# Patient Record
Sex: FEMALE | Race: BLACK OR AFRICAN AMERICAN | NOT HISPANIC OR LATINO
[De-identification: names, ages, dates, MRNs, and addresses within clinical notes are randomized per-mention and may not be internally consistent; named-entity substitution may affect disease eponyms.]

---

## 2017-12-21 ENCOUNTER — RESULT REVIEW (OUTPATIENT)
Age: 72
End: 2017-12-21

## 2020-04-17 ENCOUNTER — INPATIENT (INPATIENT)
Facility: HOSPITAL | Age: 75
LOS: 7 days | Discharge: HOME | DRG: 435 | End: 2020-04-25
Attending: INTERNAL MEDICINE | Admitting: INTERNAL MEDICINE
Payer: MEDICARE

## 2020-04-17 VITALS
TEMPERATURE: 98 F | DIASTOLIC BLOOD PRESSURE: 85 MMHG | HEIGHT: 61 IN | SYSTOLIC BLOOD PRESSURE: 130 MMHG | HEART RATE: 98 BPM | OXYGEN SATURATION: 98 % | RESPIRATION RATE: 16 BRPM | WEIGHT: 110.01 LBS

## 2020-04-17 DIAGNOSIS — D64.9 ANEMIA, UNSPECIFIED: ICD-10-CM

## 2020-04-17 DIAGNOSIS — E44.0 MODERATE PROTEIN-CALORIE MALNUTRITION: ICD-10-CM

## 2020-04-17 DIAGNOSIS — N39.0 URINARY TRACT INFECTION, SITE NOT SPECIFIED: ICD-10-CM

## 2020-04-17 DIAGNOSIS — E78.5 HYPERLIPIDEMIA, UNSPECIFIED: ICD-10-CM

## 2020-04-17 DIAGNOSIS — I10 ESSENTIAL (PRIMARY) HYPERTENSION: ICD-10-CM

## 2020-04-17 DIAGNOSIS — C25.9 MALIGNANT NEOPLASM OF PANCREAS, UNSPECIFIED: ICD-10-CM

## 2020-04-17 DIAGNOSIS — I26.99 OTHER PULMONARY EMBOLISM WITHOUT ACUTE COR PULMONALE: ICD-10-CM

## 2020-04-17 DIAGNOSIS — E87.6 HYPOKALEMIA: ICD-10-CM

## 2020-04-17 DIAGNOSIS — Z29.9 ENCOUNTER FOR PROPHYLACTIC MEASURES, UNSPECIFIED: ICD-10-CM

## 2020-04-17 LAB
ALBUMIN SERPL ELPH-MCNC: 3.5 G/DL — SIGNIFICANT CHANGE UP (ref 3.5–5)
ALP SERPL-CCNC: 218 U/L — HIGH (ref 40–120)
ALT FLD-CCNC: 53 U/L DA — SIGNIFICANT CHANGE UP (ref 10–60)
ANION GAP SERPL CALC-SCNC: 6 MMOL/L — SIGNIFICANT CHANGE UP (ref 5–17)
APPEARANCE UR: ABNORMAL
APTT BLD: 24.6 SEC — LOW (ref 27.5–36.3)
APTT BLD: 94.2 SEC — HIGH (ref 27.5–36.3)
AST SERPL-CCNC: 74 U/L — HIGH (ref 10–40)
BACTERIA # UR AUTO: ABNORMAL /HPF
BASOPHILS # BLD AUTO: 0 K/UL — SIGNIFICANT CHANGE UP (ref 0–0.2)
BASOPHILS NFR BLD AUTO: 0 % — SIGNIFICANT CHANGE UP (ref 0–2)
BILIRUB SERPL-MCNC: 0.8 MG/DL — SIGNIFICANT CHANGE UP (ref 0.2–1.2)
BILIRUB UR-MCNC: ABNORMAL
BUN SERPL-MCNC: 9 MG/DL — SIGNIFICANT CHANGE UP (ref 7–18)
CALCIUM SERPL-MCNC: 9.5 MG/DL — SIGNIFICANT CHANGE UP (ref 8.4–10.5)
CHLORIDE SERPL-SCNC: 102 MMOL/L — SIGNIFICANT CHANGE UP (ref 96–108)
CO2 SERPL-SCNC: 31 MMOL/L — SIGNIFICANT CHANGE UP (ref 22–31)
COLOR SPEC: ABNORMAL
CREAT SERPL-MCNC: 0.88 MG/DL — SIGNIFICANT CHANGE UP (ref 0.5–1.3)
D DIMER BLD IA.RAPID-MCNC: HIGH NG/ML DDU
DIFF PNL FLD: ABNORMAL
EOSINOPHIL # BLD AUTO: 0 K/UL — SIGNIFICANT CHANGE UP (ref 0–0.5)
EOSINOPHIL NFR BLD AUTO: 0 % — SIGNIFICANT CHANGE UP (ref 0–6)
EPI CELLS # UR: ABNORMAL /HPF
GLUCOSE SERPL-MCNC: 116 MG/DL — HIGH (ref 70–99)
GLUCOSE UR QL: NEGATIVE — SIGNIFICANT CHANGE UP
HCT VFR BLD CALC: 30.1 % — LOW (ref 34.5–45)
HCT VFR BLD CALC: 34.6 % — SIGNIFICANT CHANGE UP (ref 34.5–45)
HGB BLD-MCNC: 10.7 G/DL — LOW (ref 11.5–15.5)
HGB BLD-MCNC: 9.5 G/DL — LOW (ref 11.5–15.5)
INR BLD: 1.37 RATIO — HIGH (ref 0.88–1.16)
KETONES UR-MCNC: ABNORMAL
LEUKOCYTE ESTERASE UR-ACNC: ABNORMAL
LIDOCAIN IGE QN: 6829 U/L — HIGH (ref 73–393)
LYMPHOCYTES # BLD AUTO: 1.4 K/UL — SIGNIFICANT CHANGE UP (ref 1–3.3)
LYMPHOCYTES # BLD AUTO: 17 % — SIGNIFICANT CHANGE UP (ref 13–44)
MANUAL SMEAR VERIFICATION: SIGNIFICANT CHANGE UP
MCHC RBC-ENTMCNC: 23.2 PG — LOW (ref 27–34)
MCHC RBC-ENTMCNC: 23.3 PG — LOW (ref 27–34)
MCHC RBC-ENTMCNC: 30.9 GM/DL — LOW (ref 32–36)
MCHC RBC-ENTMCNC: 31.6 GM/DL — LOW (ref 32–36)
MCV RBC AUTO: 73.8 FL — LOW (ref 80–100)
MCV RBC AUTO: 74.9 FL — LOW (ref 80–100)
MONOCYTES # BLD AUTO: 0.16 K/UL — SIGNIFICANT CHANGE UP (ref 0–0.9)
MONOCYTES NFR BLD AUTO: 2 % — SIGNIFICANT CHANGE UP (ref 2–14)
NEUTROPHILS # BLD AUTO: 6.66 K/UL — SIGNIFICANT CHANGE UP (ref 1.8–7.4)
NEUTROPHILS NFR BLD AUTO: 81 % — HIGH (ref 43–77)
NITRITE UR-MCNC: POSITIVE
NRBC # BLD: 0 /100 WBCS — SIGNIFICANT CHANGE UP (ref 0–0)
NRBC # BLD: 0 /100 — SIGNIFICANT CHANGE UP (ref 0–0)
PH UR: 5 — SIGNIFICANT CHANGE UP (ref 5–8)
PLAT MORPH BLD: NORMAL — SIGNIFICANT CHANGE UP
PLATELET # BLD AUTO: 256 K/UL — SIGNIFICANT CHANGE UP (ref 150–400)
PLATELET # BLD AUTO: 294 K/UL — SIGNIFICANT CHANGE UP (ref 150–400)
POTASSIUM SERPL-MCNC: 3.2 MMOL/L — LOW (ref 3.5–5.3)
POTASSIUM SERPL-SCNC: 3.2 MMOL/L — LOW (ref 3.5–5.3)
PROCALCITONIN SERPL-MCNC: 0.14 NG/ML — HIGH (ref 0.02–0.1)
PROT SERPL-MCNC: 8.7 G/DL — HIGH (ref 6–8.3)
PROT UR-MCNC: 100
PROTHROM AB SERPL-ACNC: 15.6 SEC — HIGH (ref 10–12.9)
RBC # BLD: 4.08 M/UL — SIGNIFICANT CHANGE UP (ref 3.8–5.2)
RBC # BLD: 4.62 M/UL — SIGNIFICANT CHANGE UP (ref 3.8–5.2)
RBC # FLD: 14.1 % — SIGNIFICANT CHANGE UP (ref 10.3–14.5)
RBC # FLD: 14.4 % — SIGNIFICANT CHANGE UP (ref 10.3–14.5)
RBC BLD AUTO: NORMAL — SIGNIFICANT CHANGE UP
RBC CASTS # UR COMP ASSIST: SIGNIFICANT CHANGE UP /HPF (ref 0–2)
SARS-COV-2 RNA SPEC QL NAA+PROBE: SIGNIFICANT CHANGE UP
SODIUM SERPL-SCNC: 139 MMOL/L — SIGNIFICANT CHANGE UP (ref 135–145)
SP GR SPEC: 1.02 — SIGNIFICANT CHANGE UP (ref 1.01–1.02)
TRIGL SERPL-MCNC: 112 MG/DL — SIGNIFICANT CHANGE UP (ref 10–149)
TROPONIN I SERPL-MCNC: <0.015 NG/ML — SIGNIFICANT CHANGE UP (ref 0–0.04)
UROBILINOGEN FLD QL: 8
WBC # BLD: 8.22 K/UL — SIGNIFICANT CHANGE UP (ref 3.8–10.5)
WBC # BLD: 9.09 K/UL — SIGNIFICANT CHANGE UP (ref 3.8–10.5)
WBC # FLD AUTO: 8.22 K/UL — SIGNIFICANT CHANGE UP (ref 3.8–10.5)
WBC # FLD AUTO: 9.09 K/UL — SIGNIFICANT CHANGE UP (ref 3.8–10.5)
WBC UR QL: ABNORMAL /HPF (ref 0–5)

## 2020-04-17 PROCEDURE — 71046 X-RAY EXAM CHEST 2 VIEWS: CPT | Mod: 26

## 2020-04-17 PROCEDURE — 99285 EMERGENCY DEPT VISIT HI MDM: CPT

## 2020-04-17 PROCEDURE — 93970 EXTREMITY STUDY: CPT | Mod: 26

## 2020-04-17 PROCEDURE — 71275 CT ANGIOGRAPHY CHEST: CPT | Mod: 26

## 2020-04-17 PROCEDURE — 74177 CT ABD & PELVIS W/CONTRAST: CPT | Mod: 26

## 2020-04-17 RX ORDER — POTASSIUM CHLORIDE 20 MEQ
40 PACKET (EA) ORAL ONCE
Refills: 0 | Status: COMPLETED | OUTPATIENT
Start: 2020-04-17 | End: 2020-04-17

## 2020-04-17 RX ORDER — HEPARIN SODIUM 5000 [USP'U]/ML
4000 INJECTION INTRAVENOUS; SUBCUTANEOUS ONCE
Refills: 0 | Status: COMPLETED | OUTPATIENT
Start: 2020-04-17 | End: 2020-04-17

## 2020-04-17 RX ORDER — HEPARIN SODIUM 5000 [USP'U]/ML
INJECTION INTRAVENOUS; SUBCUTANEOUS
Qty: 25000 | Refills: 0 | Status: DISCONTINUED | OUTPATIENT
Start: 2020-04-17 | End: 2020-04-17

## 2020-04-17 RX ORDER — SIMVASTATIN 20 MG/1
10 TABLET, FILM COATED ORAL AT BEDTIME
Refills: 0 | Status: DISCONTINUED | OUTPATIENT
Start: 2020-04-17 | End: 2020-04-25

## 2020-04-17 RX ORDER — ACETAMINOPHEN 500 MG
650 TABLET ORAL EVERY 6 HOURS
Refills: 0 | Status: DISCONTINUED | OUTPATIENT
Start: 2020-04-17 | End: 2020-04-25

## 2020-04-17 RX ORDER — HEPARIN SODIUM 5000 [USP'U]/ML
4000 INJECTION INTRAVENOUS; SUBCUTANEOUS EVERY 6 HOURS
Refills: 0 | Status: DISCONTINUED | OUTPATIENT
Start: 2020-04-17 | End: 2020-04-17

## 2020-04-17 RX ORDER — IOHEXOL 300 MG/ML
30 INJECTION, SOLUTION INTRAVENOUS ONCE
Refills: 0 | Status: COMPLETED | OUTPATIENT
Start: 2020-04-17 | End: 2020-04-17

## 2020-04-17 RX ORDER — CEFTRIAXONE 500 MG/1
1000 INJECTION, POWDER, FOR SOLUTION INTRAMUSCULAR; INTRAVENOUS ONCE
Refills: 0 | Status: COMPLETED | OUTPATIENT
Start: 2020-04-17 | End: 2020-04-17

## 2020-04-17 RX ORDER — ENOXAPARIN SODIUM 100 MG/ML
60 INJECTION SUBCUTANEOUS
Refills: 0 | Status: DISCONTINUED | OUTPATIENT
Start: 2020-04-17 | End: 2020-04-19

## 2020-04-17 RX ORDER — AMLODIPINE BESYLATE 2.5 MG/1
5 TABLET ORAL DAILY
Refills: 0 | Status: DISCONTINUED | OUTPATIENT
Start: 2020-04-17 | End: 2020-04-17

## 2020-04-17 RX ORDER — CEFTRIAXONE 500 MG/1
1000 INJECTION, POWDER, FOR SOLUTION INTRAMUSCULAR; INTRAVENOUS EVERY 24 HOURS
Refills: 0 | Status: DISCONTINUED | OUTPATIENT
Start: 2020-04-17 | End: 2020-04-19

## 2020-04-17 RX ORDER — SODIUM CHLORIDE 9 MG/ML
1000 INJECTION, SOLUTION INTRAVENOUS
Refills: 0 | Status: DISCONTINUED | OUTPATIENT
Start: 2020-04-17 | End: 2020-04-22

## 2020-04-17 RX ORDER — HEPARIN SODIUM 5000 [USP'U]/ML
2000 INJECTION INTRAVENOUS; SUBCUTANEOUS EVERY 6 HOURS
Refills: 0 | Status: DISCONTINUED | OUTPATIENT
Start: 2020-04-17 | End: 2020-04-17

## 2020-04-17 RX ORDER — ASPIRIN/CALCIUM CARB/MAGNESIUM 324 MG
81 TABLET ORAL DAILY
Refills: 0 | Status: DISCONTINUED | OUTPATIENT
Start: 2020-04-17 | End: 2020-04-20

## 2020-04-17 RX ORDER — MORPHINE SULFATE 50 MG/1
4 CAPSULE, EXTENDED RELEASE ORAL ONCE
Refills: 0 | Status: DISCONTINUED | OUTPATIENT
Start: 2020-04-17 | End: 2020-04-17

## 2020-04-17 RX ADMIN — HEPARIN SODIUM 4000 UNIT(S): 5000 INJECTION INTRAVENOUS; SUBCUTANEOUS at 13:49

## 2020-04-17 RX ADMIN — Medication 40 MILLIEQUIVALENT(S): at 12:34

## 2020-04-17 RX ADMIN — MORPHINE SULFATE 4 MILLIGRAM(S): 50 CAPSULE, EXTENDED RELEASE ORAL at 12:33

## 2020-04-17 RX ADMIN — Medication 40 MILLIEQUIVALENT(S): at 16:10

## 2020-04-17 RX ADMIN — HEPARIN SODIUM 900 UNIT(S)/HR: 5000 INJECTION INTRAVENOUS; SUBCUTANEOUS at 13:50

## 2020-04-17 RX ADMIN — CEFTRIAXONE 100 MILLIGRAM(S): 500 INJECTION, POWDER, FOR SOLUTION INTRAMUSCULAR; INTRAVENOUS at 13:45

## 2020-04-17 RX ADMIN — SIMVASTATIN 10 MILLIGRAM(S): 20 TABLET, FILM COATED ORAL at 23:00

## 2020-04-17 RX ADMIN — IOHEXOL 30 MILLILITER(S): 300 INJECTION, SOLUTION INTRAVENOUS at 10:33

## 2020-04-17 NOTE — ED PROVIDER NOTE - CARE PLAN
Principal Discharge DX:	Malignant neoplasm of pancreas, unspecified location of malignancy  Secondary Diagnosis:	Multiple subsegmental pulmonary emboli without acute cor pulmonale

## 2020-04-17 NOTE — H&P ADULT - PROBLEM SELECTOR PLAN 9
IMPROVE VTE Individual Risk Assessment    RISK                                                                Points  [  ] Previous VTE                                                  3  [  ] Thrombophilia                                               2  [  ] Lower limb paralysis                                      2        (unable to hold up >15 seconds)    [  ] Current Cancer                                              2         (within 6 months)  [x ] Immobilization > 24 hrs                                1  [  ] ICU/CCU stay > 24 hours                              1  [x  ] Age > 60                                                      1  IMPROVE VTE Score __2_DVT ppx Heparin drip_  )

## 2020-04-17 NOTE — H&P ADULT - PROBLEM SELECTOR PLAN 3
Pt is asymptomatic, no dysuria, no increased frequency of micturition, no hematuria.  AFebrile,  NO Leucocytosis  Positive UA   - Will start with Rocephin   - Will  c/w maintenance ivf  f/u Urine cultures Pt is asymptomatic, no dysuria, no increased frequency of micturition, no hematuria.  AFebrile,  NO Leucocytosis  Positive UA, check Ucx  - Will start with Rocephin   - Will  c/w maintenance ivf  f/u Urine cultures

## 2020-04-17 NOTE — H&P ADULT - NSHPPHYSICALEXAM_GEN_ALL_CORE
Vital Signs Last 24 Hrs  T(C): 36.8 (17 Apr 2020 14:00), Max: 36.8 (17 Apr 2020 14:00)  T(F): 98.3 (17 Apr 2020 14:00), Max: 98.3 (17 Apr 2020 14:00)  HR: 93 (17 Apr 2020 14:00) (87 - 98)  BP: 113/81 (17 Apr 2020 14:00) (113/81 - 151/83)  BP(mean): --  RR: 17 (17 Apr 2020 14:00) (16 - 18)  SpO2: 100% (17 Apr 2020 14:00) (98% - 100%)  PHYSICAL EXAM:  GENERAL: NAD  HEAD:  Atraumatic, Normocephalic  EYES:  conjunctiva and sclera clear  NECK: Supple, No JVD, Normal thyroid  CHEST/LUNG: Clear to auscultation. Clear to percussion bilaterally; No rales, rhonchi, wheezing, or rubs  HEART: Regular rate and rhythm; No murmurs, rubs, or gallops  ABDOMEN: Soft, Nontender, Nondistended; Bowel sounds present, TENDERNESS IN Left upper quadrant.  NERVOUS SYSTEM:  Alert & Oriented X3,    EXTREMITIES:  2+ Peripheral Pulses, No clubbing, cyanosis, or edema  SKIN: warm dry Vital Signs Last 24 Hrs  T(C): 36.8 (17 Apr 2020 14:00), Max: 36.8 (17 Apr 2020 14:00)  T(F): 98.3 (17 Apr 2020 14:00), Max: 98.3 (17 Apr 2020 14:00)  HR: 93 (17 Apr 2020 14:00) (87 - 98)  BP: 113/81 (17 Apr 2020 14:00) (113/81 - 151/83)  RR: 17 (17 Apr 2020 14:00) (16 - 18)  SpO2: 100% (17 Apr 2020 14:00) (98% - 100%)  PHYSICAL EXAM:  GENERAL: NAD  HEAD:  Atraumatic, Normocephalic  EYES:  conjunctiva and sclera clear  NECK: Supple, No JVD, Normal thyroid  CHEST/LUNG: Clear to auscultation. Clear to percussion bilaterally; No rales, rhonchi, wheezing, or rubs  HEART: Regular rate and rhythm; No murmurs, rubs, or gallops  ABDOMEN: Soft, Nontender, Nondistended; Bowel sounds present, TENDERNESS IN Left upper quadrant.  NERVOUS SYSTEM:  Alert & Oriented X3,    EXTREMITIES:  2+ Peripheral Pulses, No clubbing, cyanosis, or edema  SKIN: warm dry

## 2020-04-17 NOTE — H&P ADULT - ATTENDING COMMENTS
Patient seen and examined. Patient's history, vitals, labs, imaging studies reviewed. Discussed with above resident, agree with note with edits and educated on the diagnosis and management of above medical conditions. Plan of care discussed with patient, and agrees, all questions answered.   Jessy Parkinson MD  4/17/2020

## 2020-04-17 NOTE — ED PROVIDER NOTE - OBJECTIVE STATEMENT
75 y/o F patient with a significant PMHx of HTN presents to the ED for chest pain, and abdominal pain x10 days. Patient reports substernal chest pain radiating to epigastric pain and entire abdomen and back x10 days. Patient state pain is not relieved by Prilosec which was prescribed by urgent care. Patient relates SOB associated with speaking and body aches. Patient denies any family history of DVT or PE. Patient denies any fever, chills, vomiting, diarrhea, calf pain, tenderness recent travel or sick contacts, or any other complaints.

## 2020-04-17 NOTE — H&P ADULT - PROBLEM SELECTOR PLAN 7
- K is 3.2on admission,   - likely secondary to poor PO intake  - replacing KCl 40 mg x 2  - Continue to monitor electrolytes.  - f/u BMP - K is 3.2 on admission,   - likely secondary to poor PO intake  - replacing KCl 40 mg x 2  - Continue to monitor electrolytes.  - f/u BMP

## 2020-04-17 NOTE — ED PROVIDER NOTE - CLINICAL SUMMARY MEDICAL DECISION MAKING FREE TEXT BOX
Pt w/ aforementioned presentation concerning for but not limited to atypical presentation of ACS, gastritis, hepatobiliary disease, UTI, appendicitis, diverticulitis, obstruction, atypical presentation of COVID19, PE. Will get labs, imaging, give meds, monitor and reassess.

## 2020-04-17 NOTE — H&P ADULT - PROBLEM SELECTOR PLAN 2
Lipase of 6829k  CT scan showed Pulmonary embolism, Pancreatic cancer with liver metastasis.  f/u Heme oncologist   f/u FOBT  Pt doesn't have PCP and wants to have a PCP appointment on discharge.

## 2020-04-17 NOTE — H&P ADULT - NSHPSOCIALHISTORY_GEN_ALL_CORE
Pt smoked for 20 yrs and Quit 4 yrs back. She drinks Alcohol occasionally. Pt smoked for 20 yrs and Quit 4 yrs back. She drinks Alcohol occasionally, denies illicit drug use.

## 2020-04-17 NOTE — H&P ADULT - NSHPREVIEWOFSYSTEMS_GEN_ALL_CORE
REVIEW OF SYSTEMS:    CONSTITUTIONAL: No weakness, fevers or chills  EYES/ENT: No visual changes;  No vertigo or throat pain   NECK: No pain or stiffness  RESPIRATORY:  shortness of breath is present   CARDIOVASCULAR: No chest pain or palpitations  GASTROINTESTINAL:  abdominal or epigastric pain.   GENITOURINARY: No dysuria, frequency or hematuria  NEUROLOGICAL: No numbness or weakness  SKIN: No itching, burning, rashes, or lesions   All other review of systems is negative unless indicated above.

## 2020-04-17 NOTE — H&P ADULT - PROBLEM SELECTOR PLAN 1
Problem: Pulmonary embolism.   D dimer of 14k  Most likely due to Pancreatic cancer   CT scan showed Pulmonary embolism  -She was started on Heparin drip  -Continue heparin drip for now with aPTT monitoring.  -PESI score 102 based on initial VS, Class 3, Intermediate Risk: 3.2-7% 30-day mortality.  check TTE  Doppler venous u/s  currently saturating on RA Problem: Pulmonary embolism.   D dimer of 14k  Most likely due to Pancreatic cancer   CT scan showed Pulmonary embolism  -She was started on Heparin drip  -transition to Lovenox  -PESI score 102 based on initial VS, Class 3, Intermediate Risk: 3.2-7% 30-day mortality.  check TTE  Doppler venous u/s  currently saturating on RA

## 2020-04-17 NOTE — H&P ADULT - NSHPLABSRESULTS_GEN_ALL_CORE
LABS:                        10.7   8.22  )-----------( 294      ( 2020 10:41 )             34.6         139  |  102  |  9   ----------------------------<  116<H>  3.2<L>   |  31  |  0.88    Ca    9.5      2020 10:41    TPro  8.7<H>  /  Alb  3.5  /  TBili  0.8  /  DBili  x   /  AST  74<H>  /  ALT  53  /  AlkPhos  218<H>      PT/INR - ( 2020 10:41 )   PT: 15.6 sec;   INR: 1.37 ratio         PTT - ( 2020 10:41 )  PTT:24.6 sec  Urinalysis Basic - ( 2020 10:00 )    Color: Marcia / Appearance: Slightly Turbid / S.025 / pH: x  Gluc: x / Ketone: Small  / Bili: Moderate / Urobili: 8   Blood: x / Protein: 100 / Nitrite: Positive   Leuk Esterase: Moderate / RBC: 0-2 /HPF / WBC 6-10 /HPF   Sq Epi: x / Non Sq Epi: Occasional /HPF / Bacteria: Few /HPF      LIVER FUNCTIONS - ( 2020 10:41 )  Alb: 3.5 g/dL / Pro: 8.7 g/dL / ALK PHOS: 218 U/L / ALT: 53 U/L DA / AST: 74 U/L / GGT: x           Lipase, Serum: 6829 U/L (20 @ 10:41)

## 2020-04-17 NOTE — H&P ADULT - ASSESSMENT
73 y/o F from home, ambulates independently, Lives with friend with a significant PMHx of HTN, HLD, Nasal Polyps, Former smoker with 20 pack history and PSH of Tubal ligation presents to the ED for chest pain, and abdominal pain. Pt is admitted to medicine for Pulmonary Embolism. 75 y/o F from home, ambulates independently, Lives with friend with a significant PMHx of HTN, HLD, Nasal Polyps, Former smoker with 20 pack history and PSH of Tubal ligation presents to the ED for chest pain, and abdominal pain. Pt is admitted to medicine for Pulmonary Embolism.

## 2020-04-17 NOTE — H&P ADULT - HISTORY OF PRESENT ILLNESS
73 y/o F from home, ambulates independently, Lives with friend with a significant PMHx of HTN, HLD, Nasal Polyps, Former smoker with 20 pack history and PSH of Tubal ligation presents to the ED for chest pain, and abdominal pain since 12 days. Abdominal pain was radiating to the chest and back. Pt states she had Right arm pain and she was taking Aspirin every 4 hrs. But it was not relieved and she called [phone]. They talked to her over phone and asked her to take Prilosec. But her symptoms got worse and she went to Urgent care today. She was also bleeding from the nose lsat week but that was due to polyps. Patient denies any family history of DVT or PE. Patient denies any fever, chills, vomiting, diarrhea, calf pain, Dizziness, Dysuria,  recent travel or sick contacts, or any other complaints.  GOC Full code 73 y/o F from home, ambulates independently, Lives with friend with a significant PMHx of HTN, HLD, Nasal Polyps, Former smoker with 20 pack history and PSH of Tubal ligation presents to the ED for chest pain, and abdominal pain since 12 days. Abdominal pain was radiating to the chest and back. Pt states she had Right arm pain and she was taking Aspirin every 4 hrs. But it was not relieved and she called 311. They talked to her over phone and asked her to take Prilosec. But her symptoms got worse and she went to Urgent care today. She was also bleeding from the nose lsat week but that was due to polyps. She has weight loss, good appetite. Patient denies any family history of DVT or PE. Patient denies any fever, chills, vomiting, diarrhea, calf pain, Dizziness, Dysuria,  recent travel or sick contacts, or any other complaints.  GOC Full code

## 2020-04-17 NOTE — ED PROVIDER NOTE - PHYSICAL EXAMINATION
General: pt lying in stretcher, appears stated age and is not in distress  HEENT: AT/NC, pink conjunctiva, anicteric sclerae, EOMI, PERRLA, TMs smooth, grey, intact, with normal landmarks, nasal mucosa pink, no discharge, turbinates not enlarged; moist mucus membranes, tongue well-papillated, good dentition; posterior pharynx shows no erythema or exudates;   Neck: supple, full ROM, trachea midline, no JVD, no cervical LAD, no midline ttp or stepoffs  Lungs: symmetric excursion, b/l clear vesicular breath sounds with no wheezes, crackles, or rhonchi  Heart: rrr, S1, S2 normal; no S3 or S4; no murmurs or rubs  Abd: normal bowel sounds; soft, nontender; negative McBurney's point tenderness, negative Mcdaniels's sign, no rebound, no guarding, spleen non-palpable; no hepatomegaly, no masses  Back: no midline spinal tenderness or stepoffs, no costovertebral angle tenderness  Extremities: no clubbing, cyanosis, or edema; no palpable deformities or fractures  Skin: good turgor; no rashes, petechiae, ecchymoses, or jaundice  Pulses: radial, posterior tibialis (PT), dorsalis pedis (DP) all 2+ & symmetric  Neuro: awake, alert, responsive; oriented to person, place and time; cranial nerves intact, EOMI, intact jaw movement, intact facial sensation, no facial asymmetry, hearing intact; no nystagmus, tongue midline; Motor: Normal tone in upper and lower extremities bilaterally strength 5/5; Sensory: intact to pinprick and light touch; Cerebellar: finger-to-nose intact; normal steady gait; negative Romberg’s sign; DTR: biceps, triceps, patellar, 2+, no pronator drift General: pt lying in stretcher, appears stated age and is not in distress, has mild tachypnea but speaking in full clear sentences  HEENT: AT/NC, pink conjunctiva, anicteric sclerae, EOMI, PERRLA, moist mucus membranes  Neck: supple, full ROM, trachea midline, no JVD, no cervical LAD, no midline ttp or stepoffs  Lungs: symmetric excursion, b/l clear vesicular breath sounds with no wheezes, crackles, or rhonchi  Heart: rrr, S1, S2 normal; no S3 or S4; no murmurs or rubs  Abd: normal bowel sounds; soft, generalized tenderness to palpation; no rebound, no guarding, spleen non-palpable; no hepatomegaly, no masses  Back: no midline spinal tenderness or stepoffs, no costovertebral angle tenderness  Extremities: no clubbing, cyanosis, or edema; no palpable deformities or fractures  Skin: good turgor; no rashes, petechiae, ecchymoses, or jaundice  Pulses: radial, posterior tibialis (PT), dorsalis pedis (DP) all 2+ & symmetric  Neuro: awake, alert, responsive; oriented to person, place and time; cranial nerves intact, EOMI, intact jaw movement, intact facial sensation, no facial asymmetry, hearing intact; no nystagmus, tongue midline; Motor: Normal tone in upper and lower extremities bilaterally strength 5/5; Sensory: intact; normal steady gait

## 2020-04-18 LAB
24R-OH-CALCIDIOL SERPL-MCNC: 72.7 NG/ML — SIGNIFICANT CHANGE UP (ref 30–80)
A1C WITH ESTIMATED AVERAGE GLUCOSE RESULT: 6.2 % — HIGH (ref 4–5.6)
ALBUMIN SERPL ELPH-MCNC: 3.6 G/DL — SIGNIFICANT CHANGE UP (ref 3.5–5)
ALP SERPL-CCNC: 245 U/L — HIGH (ref 40–120)
ALT FLD-CCNC: 54 U/L DA — SIGNIFICANT CHANGE UP (ref 10–60)
ANION GAP SERPL CALC-SCNC: 8 MMOL/L — SIGNIFICANT CHANGE UP (ref 5–17)
APTT BLD: 26 SEC — LOW (ref 27.5–36.3)
AST SERPL-CCNC: 73 U/L — HIGH (ref 10–40)
BASOPHILS # BLD AUTO: 0.09 K/UL — SIGNIFICANT CHANGE UP (ref 0–0.2)
BASOPHILS NFR BLD AUTO: 0.9 % — SIGNIFICANT CHANGE UP (ref 0–2)
BILIRUB SERPL-MCNC: 0.7 MG/DL — SIGNIFICANT CHANGE UP (ref 0.2–1.2)
BUN SERPL-MCNC: 9 MG/DL — SIGNIFICANT CHANGE UP (ref 7–18)
CALCIUM SERPL-MCNC: 10.1 MG/DL — SIGNIFICANT CHANGE UP (ref 8.4–10.5)
CHLORIDE SERPL-SCNC: 102 MMOL/L — SIGNIFICANT CHANGE UP (ref 96–108)
CHOLEST SERPL-MCNC: 194 MG/DL — SIGNIFICANT CHANGE UP (ref 10–199)
CK SERPL-CCNC: 74 U/L — SIGNIFICANT CHANGE UP (ref 21–215)
CO2 SERPL-SCNC: 28 MMOL/L — SIGNIFICANT CHANGE UP (ref 22–31)
CREAT SERPL-MCNC: 0.8 MG/DL — SIGNIFICANT CHANGE UP (ref 0.5–1.3)
CRP SERPL-MCNC: 6.28 MG/DL — HIGH (ref 0–0.4)
D DIMER BLD IA.RAPID-MCNC: HIGH NG/ML DDU
EOSINOPHIL # BLD AUTO: 0.32 K/UL — SIGNIFICANT CHANGE UP (ref 0–0.5)
EOSINOPHIL NFR BLD AUTO: 3.2 % — SIGNIFICANT CHANGE UP (ref 0–6)
ERYTHROCYTE [SEDIMENTATION RATE] IN BLOOD: 34 MM/HR — HIGH (ref 0–20)
ESTIMATED AVERAGE GLUCOSE: 131 MG/DL — HIGH (ref 68–114)
FERRITIN SERPL-MCNC: 2208 NG/ML — HIGH (ref 15–150)
FOLATE SERPL-MCNC: 14 NG/ML — SIGNIFICANT CHANGE UP
GLUCOSE SERPL-MCNC: 121 MG/DL — HIGH (ref 70–99)
HCT VFR BLD CALC: 36.6 % — SIGNIFICANT CHANGE UP (ref 34.5–45)
HCV AB S/CO SERPL IA: 0.14 S/CO — SIGNIFICANT CHANGE UP (ref 0–0.99)
HCV AB SERPL-IMP: SIGNIFICANT CHANGE UP
HDLC SERPL-MCNC: 32 MG/DL — LOW
HGB BLD-MCNC: 11.4 G/DL — LOW (ref 11.5–15.5)
IMM GRANULOCYTES NFR BLD AUTO: 0.4 % — SIGNIFICANT CHANGE UP (ref 0–1.5)
INR BLD: 1.19 RATIO — HIGH (ref 0.88–1.16)
IRON SATN MFR SERPL: 22 % — SIGNIFICANT CHANGE UP (ref 15–50)
IRON SATN MFR SERPL: 69 UG/DL — SIGNIFICANT CHANGE UP (ref 40–150)
LDH SERPL L TO P-CCNC: 429 U/L — HIGH (ref 120–225)
LIPID PNL WITH DIRECT LDL SERPL: 137 MG/DL — SIGNIFICANT CHANGE UP
LYMPHOCYTES # BLD AUTO: 1.37 K/UL — SIGNIFICANT CHANGE UP (ref 1–3.3)
LYMPHOCYTES # BLD AUTO: 13.8 % — SIGNIFICANT CHANGE UP (ref 13–44)
MAGNESIUM SERPL-MCNC: 2.5 MG/DL — SIGNIFICANT CHANGE UP (ref 1.6–2.6)
MCHC RBC-ENTMCNC: 23.4 PG — LOW (ref 27–34)
MCHC RBC-ENTMCNC: 31.1 GM/DL — LOW (ref 32–36)
MCV RBC AUTO: 75 FL — LOW (ref 80–100)
MONOCYTES # BLD AUTO: 0.59 K/UL — SIGNIFICANT CHANGE UP (ref 0–0.9)
MONOCYTES NFR BLD AUTO: 6 % — SIGNIFICANT CHANGE UP (ref 2–14)
NEUTROPHILS # BLD AUTO: 7.49 K/UL — HIGH (ref 1.8–7.4)
NEUTROPHILS NFR BLD AUTO: 75.7 % — SIGNIFICANT CHANGE UP (ref 43–77)
NRBC # BLD: 0 /100 WBCS — SIGNIFICANT CHANGE UP (ref 0–0)
PHOSPHATE SERPL-MCNC: 3.9 MG/DL — SIGNIFICANT CHANGE UP (ref 2.5–4.5)
PLATELET # BLD AUTO: 325 K/UL — SIGNIFICANT CHANGE UP (ref 150–400)
POTASSIUM SERPL-MCNC: 4.1 MMOL/L — SIGNIFICANT CHANGE UP (ref 3.5–5.3)
POTASSIUM SERPL-SCNC: 4.1 MMOL/L — SIGNIFICANT CHANGE UP (ref 3.5–5.3)
PROCALCITONIN SERPL-MCNC: 0.15 NG/ML — HIGH (ref 0.02–0.1)
PROT SERPL-MCNC: 9.3 G/DL — HIGH (ref 6–8.3)
PROTHROM AB SERPL-ACNC: 13.5 SEC — HIGH (ref 10–12.9)
RBC # BLD: 4.88 M/UL — SIGNIFICANT CHANGE UP (ref 3.8–5.2)
RBC # FLD: 14.4 % — SIGNIFICANT CHANGE UP (ref 10.3–14.5)
SODIUM SERPL-SCNC: 138 MMOL/L — SIGNIFICANT CHANGE UP (ref 135–145)
TIBC SERPL-MCNC: 311 UG/DL — SIGNIFICANT CHANGE UP (ref 250–450)
TOTAL CHOLESTEROL/HDL RATIO MEASUREMENT: 6.1 RATIO — SIGNIFICANT CHANGE UP (ref 3.3–7.1)
TRANSFERRIN SERPL-MCNC: 249 MG/DL — SIGNIFICANT CHANGE UP (ref 200–360)
TRIGL SERPL-MCNC: 123 MG/DL — SIGNIFICANT CHANGE UP (ref 10–149)
TROPONIN I SERPL-MCNC: <0.015 NG/ML — SIGNIFICANT CHANGE UP (ref 0–0.04)
TSH SERPL-MCNC: 1.04 UU/ML — SIGNIFICANT CHANGE UP (ref 0.34–4.82)
UIBC SERPL-MCNC: 242 UG/DL — SIGNIFICANT CHANGE UP (ref 110–370)
VIT B12 SERPL-MCNC: 1281 PG/ML — HIGH (ref 232–1245)
WBC # BLD: 9.9 K/UL — SIGNIFICANT CHANGE UP (ref 3.8–10.5)
WBC # FLD AUTO: 9.9 K/UL — SIGNIFICANT CHANGE UP (ref 3.8–10.5)

## 2020-04-18 PROCEDURE — 93971 EXTREMITY STUDY: CPT | Mod: 26,RT

## 2020-04-18 RX ORDER — ONDANSETRON 8 MG/1
4 TABLET, FILM COATED ORAL EVERY 8 HOURS
Refills: 0 | Status: DISCONTINUED | OUTPATIENT
Start: 2020-04-18 | End: 2020-04-25

## 2020-04-18 RX ORDER — OXYCODONE AND ACETAMINOPHEN 5; 325 MG/1; MG/1
1 TABLET ORAL EVERY 6 HOURS
Refills: 0 | Status: DISCONTINUED | OUTPATIENT
Start: 2020-04-18 | End: 2020-04-25

## 2020-04-18 RX ADMIN — OXYCODONE AND ACETAMINOPHEN 1 TABLET(S): 5; 325 TABLET ORAL at 18:48

## 2020-04-18 RX ADMIN — CEFTRIAXONE 100 MILLIGRAM(S): 500 INJECTION, POWDER, FOR SOLUTION INTRAMUSCULAR; INTRAVENOUS at 11:22

## 2020-04-18 RX ADMIN — ENOXAPARIN SODIUM 60 MILLIGRAM(S): 100 INJECTION SUBCUTANEOUS at 06:47

## 2020-04-18 RX ADMIN — ENOXAPARIN SODIUM 60 MILLIGRAM(S): 100 INJECTION SUBCUTANEOUS at 17:04

## 2020-04-18 RX ADMIN — SIMVASTATIN 10 MILLIGRAM(S): 20 TABLET, FILM COATED ORAL at 21:24

## 2020-04-18 RX ADMIN — Medication 81 MILLIGRAM(S): at 11:22

## 2020-04-18 NOTE — CHART NOTE - NSCHARTNOTEFT_GEN_A_CORE
Pt with newly diagnosed PE and pancreatic adenoCA with liver mets, unknown previously.   Has had ~1mo h/o R arm pain with swelling, abdominal pain/back discomfort, has been treated for heart burn. No prior family h/o of malignancy except for a niece with breast CA. Weight loss over several months, not dramatic per pt. No change in appetite.   Pt HDS, saturating well on RA. Dr. Haynes consulted for hem/onc, RUE duplex ordered to eval DVT. D/w Dr. Parkinson.

## 2020-04-18 NOTE — PROGRESS NOTE ADULT - ASSESSMENT
75 y/o F from home, ambulates independently, lives with friend with a significant PMHx of HTN, HLD, Nasal Polyps, Former smoker with 20 pack history and PSH of Tubal ligation presents to the ED for chest pain, and abdominal pain. Pt is admitted to medicine for Pulmonary Embolism.        > Pulmonary embolism.    D dimer elevation of 14k  Most likely due to Pancreatic cancer   CT scan showed Pulmonary embolism  c/w Lovenox bid  f/u TTE  Doppler venous u/s - negative  currently saturating well on RA.    > Pancreatic carcinoma metastatic to liver.    initial Lipase of 6829  CT scan showed Pulmonary embolism, Pancreatic cancer with liver metastasis.  f/u Heme oncologist - Dr. Haynes  f/u FOBT  Pt doesn't have PCP and wants to have a PCP appointment on discharge.       > UTI (urinary tract infection).  Plan: Pt is asymptomatic, no dysuria, no increased frequency of micturition, no hematuria.  AFebrile,  NO Leucocytosis  Positive UA, pending Ucx  is on Rocephin     > HTN (hypertension).    Holding anti hypertensives Amlodipine, Enalapril for now due to Borderline low BP.       > Hyperlipidemia.  Plan: continue with statin  LDL- 137      > Anemia. Plan: Pt with Hb  10.7, most likely due to Malignancy   FOBT  f/u iron studies, Vit B12 and Folate.    > Hypokalemia.  - K 3.2 on admission,   likely secondary to poor PO intake  replaced, and corrected  continue to monitor electrolytes.  f/u BMP.    > Moderate protein-calorie malnutrition.   likely due to Malignancy   f/u nutritionist.        Patient seen and examined. Patient's history, vitals, labs, imaging studies reviewed. Discussed with PA and educated on the diagnosis and management of above medical conditions. Plan of care discussed with patient, and agrees, all questions answered.   Jessy Parkinson MD

## 2020-04-18 NOTE — PROGRESS NOTE ADULT - SUBJECTIVE AND OBJECTIVE BOX
Patient is a 74 year old  Female who presents with a chief complaint of Abdominal pain (2020 17:28)        MEDICATIONS  (STANDING):  aspirin  chewable 81 milliGRAM(s) Oral daily  cefTRIAXone   IVPB 1000 milliGRAM(s) IV Intermittent every 24 hours  enoxaparin Injectable 60 milliGRAM(s) SubCutaneous two times a day  lactated ringers. 1000 milliLiter(s) (75 mL/Hr) IV Continuous <Continuous>  simvastatin 10 milliGRAM(s) Oral at bedtime    MEDICATIONS  (PRN):  acetaminophen   Tablet .. 650 milliGRAM(s) Oral every 6 hours PRN Temp greater or equal to 38C (100.4F), Mild Pain (1 - 3)      REVIEW OF SYSTEMS:  CONSTITUTIONAL: No fever, weight loss, or fatigue  EYES: No eye pain, visual disturbances, or discharge  ENMT:  No difficulty hearing, tinnitus, vertigo; No sinus or throat pain  NECK: No pain or stiffness  RESPIRATORY: No cough, wheezing, chills or hemoptysis; No shortness of breath  CARDIOVASCULAR: No chest pain, palpitations, dizziness, or leg swelling  GASTROINTESTINAL: No abdominal or epigastric pain. No nausea, vomiting, or hematemesis; No diarrhea or constipation. No melena or hematochezia.  GENITOURINARY: No dysuria, frequency, hematuria, or incontinence  NEUROLOGICAL: No headaches, memory loss, loss of strength, numbness, or tremors  SKIN: No itching, burning, rashes, or lesions   LYMPH NODES: No enlarged glands  ENDOCRINE: No heat or cold intolerance; No hair loss  MUSCULOSKELETAL: No joint pain or swelling; No muscle, back, or extremity pain  PSYCHIATRIC: No depression, anxiety, mood swings, or difficulty sleeping  HEME/LYMPH: No easy bruising, or bleeding gums  ALLERY AND IMMUNOLOGIC: No hives or eczema    PHYSICAL EXAM:    T(C): 37 (20 @ 11:05), Max: 37.3 (20 @ 07:41)  HR: 96 (20 @ 11:05) (77 - 96)  BP: 124/78 (20 @ 11:05) (108/64 - 124/78)  RR: 18 (20 @ 11:05) (16 - 19)  SpO2: 100% (20 @ 11:05) (97% - 100%)        GENERAL: NAD, well-groomed, well-developed  HEAD:  Atraumatic, Normocephalic  EYES: EOMI, PERRL, conjunctiva and sclera clear  ENMT: No tonsillar erythema, exudates, or enlargement; Moist mucous membranes, Good dentition, No lesions  NECK: Supple, No JVD, Normal thyroid  NERVOUS SYSTEM:  Alert & Oriented X3, Good concentration; Motor Strength 5/5 B/L upper and lower extremities; DTRs 2+ intact and symmetric  CHEST/LUNG: Clear to ascultation bilaterally; No rales, rhonchi, wheezing, or rubs  HEART: Regular rate and rhythm; No murmurs, rubs, or gallops  ABDOMEN: Soft, Nontender, Nondistended; Bowel sounds present  EXTREMITIES:  2+ Peripheral Pulses, No clubbing, cyanosis, or edema  LYMPH: No lymphadenopathy noted  SKIN: No rashes or lesions    LABS:                        11.4   9.90  )-----------( 325      ( 2020 06:38 )             36.6     04-18    138  |  102  |  9   ----------------------------<  121<H>  4.1   |  28  |  0.80    Ca    10.1      2020 06:38  Phos  3.9     -18  Mg     2.5     -18    TPro  9.3<H>  /  Alb  3.6  /  TBili  0.7  /  DBili  x   /  AST  73<H>  /  ALT  54  /  AlkPhos  245<H>  04-18    PT/INR - ( 2020 06:38 )   PT: 13.5 sec;   INR: 1.19 ratio         PTT - ( 2020 06:38 )  PTT:26.0 sec  Urinalysis Basic - ( 2020 10:00 )    Color: Marcia / Appearance: Slightly Turbid / S.025 / pH: x  Gluc: x / Ketone: Small  / Bili: Moderate / Urobili: 8   Blood: x / Protein: 100 / Nitrite: Positive   Leuk Esterase: Moderate / RBC: 0-2 /HPF / WBC 6-10 /HPF   Sq Epi: x / Non Sq Epi: Occasional /HPF / Bacteria: Few /HPF      RADIOLOGY & ADDITIONAL TESTS:    Imaging Personally Reviewed:  [x] YES  [ ] NO    Consultant(s) Notes Reviewed:  [x] YES  [ ] NO    Care Discussed with Consultants/Other Providers [x] YES  [ ] NO Patient is a 74 year old  Female who presents with a chief complaint of Abdominal pain (2020 17:28)    Patient seen and examined, reports she feels better today    MEDICATIONS  (STANDING):  aspirin  chewable 81 milliGRAM(s) Oral daily  cefTRIAXone   IVPB 1000 milliGRAM(s) IV Intermittent every 24 hours  enoxaparin Injectable 60 milliGRAM(s) SubCutaneous two times a day  lactated ringers. 1000 milliLiter(s) (75 mL/Hr) IV Continuous <Continuous>  simvastatin 10 milliGRAM(s) Oral at bedtime    MEDICATIONS  (PRN):  acetaminophen   Tablet .. 650 milliGRAM(s) Oral every 6 hours PRN Temp greater or equal to 38C (100.4F), Mild Pain (1 - 3)      REVIEW OF SYSTEMS:  CONSTITUTIONAL: No fever, weight loss, has fatigue  EYES: No eye pain, visual disturbances, or discharge  ENMT:  No difficulty hearing, tinnitus, vertigo; No sinus or throat pain  NECK: No pain or stiffness  RESPIRATORY: No cough, wheezing, chills or hemoptysis; No shortness of breath  CARDIOVASCULAR: No chest pain, palpitations, dizziness, or leg swelling  GASTROINTESTINAL: No abdominal or epigastric pain. No nausea, vomiting, or hematemesis; No diarrhea or constipation. No melena or hematochezia.  GENITOURINARY: No dysuria, frequency, hematuria, or incontinence  NEUROLOGICAL: No headaches, memory loss, loss of strength, numbness, or tremors  SKIN: No itching, burning, rashes, or lesions   LYMPH NODES: No enlarged glands  ENDOCRINE: No heat or cold intolerance; No hair loss  MUSCULOSKELETAL: No joint pain or swelling; No muscle, back, or extremity pain  PSYCHIATRIC: No depression, anxiety, mood swings, or difficulty sleeping  HEME/LYMPH: No easy bruising, or bleeding gums  ALLERGY AND IMMUNOLOGIC: No hives or eczema    PHYSICAL EXAM:    T(C): 37 (20 @ 11:05), Max: 37.3 (20 @ 07:41)  HR: 96 (20 @ 11:05) (77 - 96)  BP: 124/78 (20 @ 11:05) (108/64 - 124/78)  RR: 18 (20 @ 11:05) (16 - 19)  SpO2: 100% (04-18-20 @ 11:05) (97% - 100%)        GENERAL: Elderly thin female NAD  HEAD:  Atraumatic, Normocephalic  EYES: EOMI, PERRL, conjunctiva and sclera clear  ENMT: No tonsillar erythema, exudates, or enlargement; Moist mucous membranes, Good dentition, No lesions  NECK: Supple, No JVD, Normal thyroid  NERVOUS SYSTEM:  Alert & Oriented X3, no focal deficit  CHEST/LUNG: Clear to ascultation bilaterally; No rales, rhonchi, wheezing, or rubs  HEART: Regular rate and rhythm; No murmurs, rubs, or gallops  ABDOMEN: Soft, Nontender, Nondistended; Bowel sounds present  EXTREMITIES:  2+ Peripheral Pulses, No clubbing, cyanosis, or edema  SKIN: No rash    LABS:                        11.4   9.90  )-----------( 325      ( 2020 06:38 )             36.6     04-18    138  |  102  |  9   ----------------------------<  121<H>  4.1   |  28  |  0.80    Ca    10.1      2020 06:38  Phos  3.9     04-18  Mg     2.5     04-18    TPro  9.3<H>  /  Alb  3.6  /  TBili  0.7  /  DBili  x   /  AST  73<H>  /  ALT  54  /  AlkPhos  245<H>  04-18    PT/INR - ( 2020 06:38 )   PT: 13.5 sec;   INR: 1.19 ratio         PTT - ( 2020 06:38 )  PTT:26.0 sec  Urinalysis Basic - ( 2020 10:00 )    Color: Marcia / Appearance: Slightly Turbid / S.025 / pH: x  Gluc: x / Ketone: Small  / Bili: Moderate / Urobili: 8   Blood: x / Protein: 100 / Nitrite: Positive   Leuk Esterase: Moderate / RBC: 0-2 /HPF / WBC 6-10 /HPF   Sq Epi: x / Non Sq Epi: Occasional /HPF / Bacteria: Few /HPF      RADIOLOGY & ADDITIONAL TESTS:    Imaging Reviewed:  [x] YES  [ ] NO    Consultant(s) Notes Reviewed:  [x] YES  [ ] NO    Care Discussed with Consultants/Other Providers [x] YES  [ ] NO

## 2020-04-19 DIAGNOSIS — C25.9 MALIGNANT NEOPLASM OF PANCREAS, UNSPECIFIED: ICD-10-CM

## 2020-04-19 LAB
ALBUMIN SERPL ELPH-MCNC: 3.4 G/DL — LOW (ref 3.5–5)
ALP SERPL-CCNC: 254 U/L — HIGH (ref 40–120)
ALT FLD-CCNC: 53 U/L DA — SIGNIFICANT CHANGE UP (ref 10–60)
ANION GAP SERPL CALC-SCNC: 6 MMOL/L — SIGNIFICANT CHANGE UP (ref 5–17)
AST SERPL-CCNC: 73 U/L — HIGH (ref 10–40)
BASOPHILS # BLD AUTO: 0.05 K/UL — SIGNIFICANT CHANGE UP (ref 0–0.2)
BASOPHILS NFR BLD AUTO: 0.6 % — SIGNIFICANT CHANGE UP (ref 0–2)
BILIRUB SERPL-MCNC: 0.6 MG/DL — SIGNIFICANT CHANGE UP (ref 0.2–1.2)
BUN SERPL-MCNC: 13 MG/DL — SIGNIFICANT CHANGE UP (ref 7–18)
CALCIUM SERPL-MCNC: 9.6 MG/DL — SIGNIFICANT CHANGE UP (ref 8.4–10.5)
CHLORIDE SERPL-SCNC: 100 MMOL/L — SIGNIFICANT CHANGE UP (ref 96–108)
CO2 SERPL-SCNC: 30 MMOL/L — SIGNIFICANT CHANGE UP (ref 22–31)
CREAT SERPL-MCNC: 0.91 MG/DL — SIGNIFICANT CHANGE UP (ref 0.5–1.3)
EOSINOPHIL # BLD AUTO: 0.28 K/UL — SIGNIFICANT CHANGE UP (ref 0–0.5)
EOSINOPHIL NFR BLD AUTO: 3.3 % — SIGNIFICANT CHANGE UP (ref 0–6)
GLUCOSE SERPL-MCNC: 138 MG/DL — HIGH (ref 70–99)
HCT VFR BLD CALC: 36.7 % — SIGNIFICANT CHANGE UP (ref 34.5–45)
HGB BLD-MCNC: 11.4 G/DL — LOW (ref 11.5–15.5)
IMM GRANULOCYTES NFR BLD AUTO: 0.4 % — SIGNIFICANT CHANGE UP (ref 0–1.5)
LYMPHOCYTES # BLD AUTO: 1.41 K/UL — SIGNIFICANT CHANGE UP (ref 1–3.3)
LYMPHOCYTES # BLD AUTO: 16.7 % — SIGNIFICANT CHANGE UP (ref 13–44)
MAGNESIUM SERPL-MCNC: 2.4 MG/DL — SIGNIFICANT CHANGE UP (ref 1.6–2.6)
MCHC RBC-ENTMCNC: 23.2 PG — LOW (ref 27–34)
MCHC RBC-ENTMCNC: 31.1 GM/DL — LOW (ref 32–36)
MCV RBC AUTO: 74.7 FL — LOW (ref 80–100)
MONOCYTES # BLD AUTO: 0.74 K/UL — SIGNIFICANT CHANGE UP (ref 0–0.9)
MONOCYTES NFR BLD AUTO: 8.8 % — SIGNIFICANT CHANGE UP (ref 2–14)
NEUTROPHILS # BLD AUTO: 5.93 K/UL — SIGNIFICANT CHANGE UP (ref 1.8–7.4)
NEUTROPHILS NFR BLD AUTO: 70.2 % — SIGNIFICANT CHANGE UP (ref 43–77)
NRBC # BLD: 0 /100 WBCS — SIGNIFICANT CHANGE UP (ref 0–0)
PHOSPHATE SERPL-MCNC: 4.5 MG/DL — SIGNIFICANT CHANGE UP (ref 2.5–4.5)
PLATELET # BLD AUTO: 325 K/UL — SIGNIFICANT CHANGE UP (ref 150–400)
POTASSIUM SERPL-MCNC: 3.8 MMOL/L — SIGNIFICANT CHANGE UP (ref 3.5–5.3)
POTASSIUM SERPL-SCNC: 3.8 MMOL/L — SIGNIFICANT CHANGE UP (ref 3.5–5.3)
PROT SERPL-MCNC: 8.9 G/DL — HIGH (ref 6–8.3)
RBC # BLD: 4.91 M/UL — SIGNIFICANT CHANGE UP (ref 3.8–5.2)
RBC # FLD: 14.2 % — SIGNIFICANT CHANGE UP (ref 10.3–14.5)
SODIUM SERPL-SCNC: 136 MMOL/L — SIGNIFICANT CHANGE UP (ref 135–145)
WBC # BLD: 8.44 K/UL — SIGNIFICANT CHANGE UP (ref 3.8–10.5)
WBC # FLD AUTO: 8.44 K/UL — SIGNIFICANT CHANGE UP (ref 3.8–10.5)

## 2020-04-19 RX ORDER — ENOXAPARIN SODIUM 100 MG/ML
50 INJECTION SUBCUTANEOUS EVERY 12 HOURS
Refills: 0 | Status: DISCONTINUED | OUTPATIENT
Start: 2020-04-19 | End: 2020-04-24

## 2020-04-19 RX ADMIN — Medication 81 MILLIGRAM(S): at 11:09

## 2020-04-19 RX ADMIN — ENOXAPARIN SODIUM 60 MILLIGRAM(S): 100 INJECTION SUBCUTANEOUS at 06:26

## 2020-04-19 RX ADMIN — ENOXAPARIN SODIUM 50 MILLIGRAM(S): 100 INJECTION SUBCUTANEOUS at 17:29

## 2020-04-19 RX ADMIN — CEFTRIAXONE 100 MILLIGRAM(S): 500 INJECTION, POWDER, FOR SOLUTION INTRAMUSCULAR; INTRAVENOUS at 11:08

## 2020-04-19 RX ADMIN — SIMVASTATIN 10 MILLIGRAM(S): 20 TABLET, FILM COATED ORAL at 22:31

## 2020-04-19 RX ADMIN — OXYCODONE AND ACETAMINOPHEN 1 TABLET(S): 5; 325 TABLET ORAL at 20:41

## 2020-04-19 NOTE — PROGRESS NOTE ADULT - ASSESSMENT
75 y/o F from home, ambulates independently, lives with friend with a significant PMHx of HTN, HLD, Nasal Polyps, Former smoker with 20 pack history and PSH of Tubal ligation presents to the ED for chest pain, and abdominal pain. Pt is admitted to medicine for Pulmonary Embolism.        > Pulmonary embolism.    D dimer elevation of 14k on admission  Most likely due to Pancreatic cancer   CT scan showed Pulmonary embolism  c/w Lovenox bid  TTE noted  Doppler venous u/s - negative  currently saturating well on RA.  COVID 19 negative    > Pancreatic carcinoma metastatic to liver.    initial Lipase of 6829, monitor  CT scan showed Pulmonary embolism, Pancreatic cancer with liver metastasis.  f/u Heme oncologist - Dr. Haynes  f/u FOBT  Pt doesn't have PCP and wants to have a PCP appointment on discharge.       > UTI (urinary tract infection).  Plan: Pt is asymptomatic, no dysuria, no increased frequency of micturition, no hematuria.  AFebrile,  NO Leucocytosis  Positive UA  completed Rocephin     > HTN (hypertension).    Holding anti hypertensives Amlodipine, Enalapril for now due to Borderline low BP, resume gradually      > Hyperlipidemia.  Plan: continue with statin  LDL- 137      > Anemia. Plan: Pt with Hb  10.7, most likely due to Malignancy  H/H currently stable   FOBT  f/u iron studies, Vit B12 and Folate.    > Hypokalemia.  - K 3.2 on admission,   likely secondary to poor PO intake  replaced, and corrected  continue to monitor electrolytes.  f/u BMP.    > Moderate protein-calorie malnutrition.   likely due to Malignancy   f/u nutritionist.        Patient seen and examined. Patient's history, vitals, labs, imaging studies reviewed. Plan of care discussed with patient, and agrees, all questions answered.   Jessy Parkinson MD

## 2020-04-19 NOTE — DIETITIAN INITIAL EVALUATION ADULT. - PROBLEM SELECTOR PLAN 3
Pt is asymptomatic, no dysuria, no increased frequency of micturition, no hematuria.  AFebrile,  NO Leucocytosis  Positive UA, check Ucx  - Will start with Rocephin   - Will  c/w maintenance ivf  f/u Urine cultures

## 2020-04-19 NOTE — CONSULT NOTE ADULT - SUBJECTIVE AND OBJECTIVE BOX
Patient is a 74y old  Female who presents with a chief complaint of Abdominal pain (19 Apr 2020 11:32)      HPI:  75 y/o F from home, ambulates independently, Lives with friend with a significant PMHx of HTN, HLD, Nasal Polyps, Former smoker with 20 pack history and PSH of Tubal ligation presents to the ED for chest pain, and abdominal pain since 12 days. Abdominal pain was radiating to the chest and back. Pt states she had Right arm pain and she was taking Aspirin every 4 hrs. But it was not relieved and she called 311. They talked to her over phone and asked her to take Prilosec. But her symptoms got worse and she went to Urgent care today. She was also bleeding from the nose lsat week but that was due to polyps. She has weight loss, good appetite. Patient denies any family history of DVT or PE. Patient denies any fever, chills, vomiting, diarrhea, calf pain, Dizziness, Dysuria,  recent travel or sick contacts, or any other complaints.CT showed pancreatic mass with many liver mets.  CTA showed PE.  Lipase is 6000.  [facility] Full code (17 Apr 2020 17:28)       ROS:  Negative except for:    PAST MEDICAL & SURGICAL HISTORY:  Hyperlipidemia  HTN (hypertension)  No significant past surgical history      SOCIAL HISTORY:    FAMILY HISTORY:  FH: diabetes mellitus: Parents  FH: breast cancer: niece      MEDICATIONS  (STANDING):  aspirin  chewable 81 milliGRAM(s) Oral daily  enoxaparin Injectable 60 milliGRAM(s) SubCutaneous two times a day  lactated ringers. 1000 milliLiter(s) (75 mL/Hr) IV Continuous <Continuous>  simvastatin 10 milliGRAM(s) Oral at bedtime    MEDICATIONS  (PRN):  acetaminophen   Tablet .. 650 milliGRAM(s) Oral every 6 hours PRN Temp greater or equal to 38C (100.4F), Mild Pain (1 - 3)  ondansetron   Disintegrating Tablet 4 milliGRAM(s) Oral every 8 hours PRN Nausea and/or Vomiting  oxycodone    5 mG/acetaminophen 325 mG 1 Tablet(s) Oral every 6 hours PRN Severe Pain (7 - 10)      Allergies    No Known Allergies    Intolerances        Vital Signs Last 24 Hrs  T(C): 37 (19 Apr 2020 12:06), Max: 37.2 (19 Apr 2020 04:45)  T(F): 98.6 (19 Apr 2020 12:06), Max: 98.9 (19 Apr 2020 04:45)  HR: 97 (19 Apr 2020 12:06) (87 - 109)  BP: 135/83 (19 Apr 2020 12:06) (112/77 - 151/90)  BP(mean): --  RR: 18 (19 Apr 2020 12:06) (18 - 20)  SpO2: 100% (19 Apr 2020 12:06) (95% - 100%)    PHYSICAL EXAM  General: adult in NAD  HEENT: clear oropharynx, anicteric sclera, pink conjunctiva  Neck: supple  CV: normal S1/S2 with no murmur rubs or gallops  Lungs: positive air movement b/l ant lungs,clear to auscultation, no wheezes, no rales  Abdomen: soft non-tender non-distended, no hepatosplenomegaly  Ext: no clubbing cyanosis or edema  Skin: no rashes and no petechiae  Neuro: alert and oriented X 4, no focal deficits      LABS:                          11.4   8.44  )-----------( 325      ( 19 Apr 2020 06:13 )             36.7         Mean Cell Volume : 74.7 fl  Mean Cell Hemoglobin : 23.2 pg  Mean Cell Hemoglobin Concentration : 31.1 gm/dL  Auto Neutrophil # : 5.93 K/uL  Auto Lymphocyte # : 1.41 K/uL  Auto Monocyte # : 0.74 K/uL  Auto Eosinophil # : 0.28 K/uL  Auto Basophil # : 0.05 K/uL  Auto Neutrophil % : 70.2 %  Auto Lymphocyte % : 16.7 %  Auto Monocyte % : 8.8 %  Auto Eosinophil % : 3.3 %  Auto Basophil % : 0.6 %      Serial CBC's  04-19 @ 06:13  Hct-36.7 / Hgb-11.4 / Plat-325 / RBC-4.91 / WBC-8.44  Serial CBC's  04-18 @ 06:38  Hct-36.6 / Hgb-11.4 / Plat-325 / RBC-4.88 / WBC-9.90  Serial CBC's  04-17 @ 20:00  Hct-30.1 / Hgb-9.5 / Plat-256 / RBC-4.08 / WBC-9.09  Serial CBC's  04-17 @ 10:41  Hct-34.6 / Hgb-10.7 / Plat-294 / RBC-4.62 / WBC-8.22      04-19    136  |  100  |  13  ----------------------------<  138<H>  3.8   |  30  |  0.91    Ca    9.6      19 Apr 2020 06:14  Phos  4.5     04-19  Mg     2.4     04-19    TPro  8.9<H>  /  Alb  3.4<L>  /  TBili  0.6  /  DBili  x   /  AST  73<H>  /  ALT  53  /  AlkPhos  254<H>  04-19      PT/INR - ( 18 Apr 2020 06:38 )   PT: 13.5 sec;   INR: 1.19 ratio         PTT - ( 18 Apr 2020 06:38 )  PTT:26.0 sec    Folate, Serum: 14.0 ng/mL (04-18 @ 10:11)  Vitamin B12, Serum: 1281 pg/mL (04-18 @ 10:11)  Ferritin, Serum: 2208 ng/mL (04-18 @ 10:11)  Iron - Total Binding Capacity.: 311 ug/dL (04-18 @ 06:38)              BLOOD SMEAR INTERPRETATION:     < from: CT Angio Chest w/ IV Cont (04.17.20 @ 12:15) >  PROCEDURE DATE:  04/17/2020          INTERPRETATION:  CLINICAL INFORMATION: r/o PE    COMPARISON: None.    PROCEDURE:   CT Angiography of the Chest was performed followed by portal venous phase imaging of the Abdomen and Pelvis.  90 ml of Omnipaque 350 was injected intravenously. 10 ml were discarded.  Sagittal and coronal reformats were performed as well as 3D (MIP) reconstructions.    FINDINGS:    CHEST:     LUNGS AND LARGE AIRWAYS: Patent central airways. A few less than 5 mm pulmonary nodules.   PLEURA: No pleural effusion.< from: CT Angio Chest w/ IV Cont (04.17.20 @ 12:15) >  VESSELS: A few scattered segmental/subsegmental pulmonary  Are noted.  HEART: Heart size is normal.  No pericardial effusion.  MEDIASTINUM AND CHRISTINE: No lymphadenopathy.   CHEST WALL AND LOWER NECK: Within normal limits.     ABDOMEN AND PELVIS:    LIVER: Numerous liver metastases. A reference 3.6 cm left lobe lesion on series 18 image 22.  BILE DUCTS: Normal caliber.  GALLBLADDER: Within normal limits.  SPLEEN: Within normal limits.   < from: CT Angio Chest w/ IV Cont (04.17.20 @ 12:15) >  IMPRESSION:   Pancreatic adenocarcinoma involving the uncinate process. Encasement and stenosis of the SMV.    Innumerable liver metastases.    Segmental/subsegmental pulmonary emboli.    The findings were discussed with Dr. SAAD GAYTAN on 4/17/2020 12:38 PM.    < end of copied text >  PANCREAS: A 2.5 cm hypovascular mass in the uncinate process of the pancreas. There is encasement of the SMV with moderate stenosis. No evidence of SMA or celiacartery involvement. Mild dilatation main pancreatic duct.  ADRENALS: Within normal limits.   KIDNEYS/URETERS: Within normal limits.     BLADDER: Within normal limits.   REPRODUCTIVE ORGANS: Within normal limits.    BOWEL: No bowel obstruction. The appendix is normal.   PERITONEUM: No ascites.   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  RETROPERITONEUM/LYMPH NODES: No lymphadenopathy.     ABDOMINAL WALL: Within normal limits.  BONES: Within normal limits.     < end of copied text >      < end of copied text >    RADIOLOGY & ADDITIONAL STUDIES:

## 2020-04-19 NOTE — DIETITIAN INITIAL EVALUATION ADULT. - PROBLEM SELECTOR PLAN 7
- K is 3.2 on admission,   - likely secondary to poor PO intake  - replacing KCl 40 mg x 2  - Continue to monitor electrolytes.  - f/u BMP

## 2020-04-19 NOTE — PROGRESS NOTE ADULT - SUBJECTIVE AND OBJECTIVE BOX
Patient is a 74 year old  Female who presents with a chief complaint of Abdominal pain (18 Apr 2020 12:36)    Patient seen and examined, reports her right arm pain and swelling improved, and she feels better today      MEDICATIONS  (STANDING):  aspirin  chewable 81 milliGRAM(s) Oral daily  enoxaparin Injectable 60 milliGRAM(s) SubCutaneous two times a day  lactated ringers. 1000 milliLiter(s) (75 mL/Hr) IV Continuous <Continuous>  simvastatin 10 milliGRAM(s) Oral at bedtime    MEDICATIONS  (PRN):  acetaminophen   Tablet .. 650 milliGRAM(s) Oral every 6 hours PRN Temp greater or equal to 38C (100.4F), Mild Pain (1 - 3)  ondansetron   Disintegrating Tablet 4 milliGRAM(s) Oral every 8 hours PRN Nausea and/or Vomiting  oxycodone    5 mG/acetaminophen 325 mG 1 Tablet(s) Oral every 6 hours PRN Severe Pain (7 - 10)      REVIEW OF SYSTEMS:  CONSTITUTIONAL: No fever, weight loss, has fatigue  EYES: No eye pain, visual disturbances, or discharge  ENMT:  No difficulty hearing, tinnitus, vertigo; No sinus or throat pain  NECK: No pain or stiffness  RESPIRATORY: No cough, wheezing, chills or hemoptysis; No shortness of breath  CARDIOVASCULAR: No chest pain, palpitations, dizziness, or leg swelling  GASTROINTESTINAL: No abdominal or epigastric pain. No nausea, vomiting, or hematemesis; No diarrhea or constipation. No melena or hematochezia.  GENITOURINARY: No dysuria, frequency, hematuria, or incontinence  NEUROLOGICAL: No headaches, memory loss, loss of strength, numbness, or tremors  SKIN: No itching, burning, rashes, or lesions   ENDOCRINE: No heat or cold intolerance; No hair loss  MUSCULOSKELETAL: No joint pain or swelling; No muscle, back, or extremity pain  PSYCHIATRIC: No depression, anxiety, mood swings, or difficulty sleeping  HEME/LYMPH: No easy bruising, or bleeding gums  ALLERGY AND IMMUNOLOGIC: No hives or eczema    PHYSICAL EXAM:    T(C): 36.6 (04-19-20 @ 08:43), Max: 37.2 (04-19-20 @ 04:45)  HR: 101 (04-19-20 @ 08:43) (87 - 109)  BP: 112/77 (04-19-20 @ 08:43) (112/77 - 151/90)  RR: 18 (04-19-20 @ 08:43) (18 - 20)  SpO2: 98% (04-19-20 @ 08:43) (95% - 100%)  I&O's Summary    18 Apr 2020 07:01  -  19 Apr 2020 07:00  --------------------------------------------------------  IN: 75 mL / OUT: 300 mL / NET: -225 mL      GENERAL: Elderly thin female NAD  HEAD:  Atraumatic, Normocephalic  EYES: EOMI, PERRL, conjunctiva and sclera clear  ENMT: No tonsillar erythema, exudates, or enlargement; Moist mucous membranes, Good dentition, No lesions  NECK: Supple, No JVD, Normal thyroid  NERVOUS SYSTEM:  Alert & Oriented X3, no focal deficit  CHEST/LUNG: Clear to ascultation bilaterally; No rales, rhonchi, wheezing, or rubs  HEART: Regular rate and rhythm; No murmurs, rubs, or gallops  ABDOMEN: Soft, Nontender, Nondistended; Bowel sounds present  EXTREMITIES:  2+ Peripheral Pulses, No clubbing, cyanosis, or edema  SKIN: No rash        LABS:                        11.4   8.44  )-----------( 325      ( 19 Apr 2020 06:13 )             36.7     04-19    136  |  100  |  13  ----------------------------<  138<H>  3.8   |  30  |  0.91    Ca    9.6      19 Apr 2020 06:14  Phos  4.5     04-19  Mg     2.4     04-19    TPro  8.9<H>  /  Alb  3.4<L>  /  TBili  0.6  /  DBili  x   /  AST  73<H>  /  ALT  53  /  AlkPhos  254<H>  04-19    PT/INR - ( 18 Apr 2020 06:38 )   PT: 13.5 sec;   INR: 1.19 ratio         PTT - ( 18 Apr 2020 06:38 )  PTT:26.0 sec    Culture - Blood (collected 04-17-20 @ 18:26)  Source: .Blood Blood-Peripheral  Preliminary Report (04-18-20 @ 19:01):    No growth to date.    Culture - Blood (collected 04-17-20 @ 18:26)  Source: .Blood Blood-Peripheral  Preliminary Report (04-18-20 @ 19:01):    No growth to date.        RADIOLOGY & ADDITIONAL TESTS:    Imaging Reviewed:  [x] YES  [ ] NO    Consultant(s) Notes Reviewed:  [x] YES  [ ] NO    Care Discussed with Consultants/Other Providers [x] YES  [ ] NO

## 2020-04-19 NOTE — DIETITIAN INITIAL EVALUATION ADULT. - OTHER INFO
Patient reports poor po intake for 3-4 weeks PTA, wt loss of 6% from usual in 3 months. does not drink milk.

## 2020-04-19 NOTE — DIETITIAN INITIAL EVALUATION ADULT. - PERTINENT LABORATORY DATA
04-19 Na136 mmol/L Glu 138 mg/dL<H> K+ 3.8 mmol/L Cr  0.91 mg/dL BUN 13 mg/dL 04-19 Phos 4.5 mg/dL 04-19 Alb 3.4 g/dL<L> 04-18 Chol 194 mg/dL  mg/dL HDL 32 mg/dL<L> Trig 123 mg/dL

## 2020-04-19 NOTE — CHART NOTE - NSCHARTNOTEFT_GEN_A_CORE
Upon Nutritional Assessment by the Registered Dietitian your patient was determined to meet criteria / has evidence of the following diagnosis/diagnoses:          [ ]  Mild Protein Calorie Malnutrition        [x]  Moderate Protein Calorie Malnutrition        [ ] Severe Protein Calorie Malnutrition        [ ] Unspecified Protein Calorie Malnutrition        [ ] Underweight / BMI <19        [ ] Morbid Obesity / BMI > 40      Findings as based on:  •  Comprehensive nutrition assessment and consultation  •  Calorie counts (nutrient intake analysis)  •  Food acceptance and intake status from observations by staff  •  Follow up  •  Patient education  •  Intervention secondary to interdisciplinary rounds  •   concerns      Treatment:    The following diet has been recommended:      PROVIDER Section:     By signing this assessment you are acknowledging and agree with the diagnosis/diagnoses assigned by the Registered Dietitian    Comments:  continue with DASH/TLC diet  , Add ensure enlive bid

## 2020-04-19 NOTE — CONSULT NOTE ADULT - PROBLEM SELECTOR RECOMMENDATION 9
with liver mets  will f/u on Ca 19.9 and CEA  the abdominal pain can be from liver mets or pancreatitis due to obstructed pancreatic duct  will discuss liver biopsy with IR on Monday

## 2020-04-19 NOTE — CONSULT NOTE ADULT - ASSESSMENT
74 year old female presented with abdominal pain and chest pain for 12 days with weight loss.  CT showed pancreatic mass, liver mets and PE.

## 2020-04-19 NOTE — DIETITIAN INITIAL EVALUATION ADULT. - PROBLEM SELECTOR PLAN 1
Problem: Pulmonary embolism.   D dimer of 14k  Most likely due to Pancreatic cancer   CT scan showed Pulmonary embolism  -She was started on Heparin drip  -transition to Lovenox  -PESI score 102 based on initial VS, Class 3, Intermediate Risk: 3.2-7% 30-day mortality.  check TTE  Doppler venous u/s  currently saturating on RA

## 2020-04-20 LAB
AFP-TM SERPL-MCNC: 2.6 NG/ML — SIGNIFICANT CHANGE UP
ALBUMIN SERPL ELPH-MCNC: 3.3 G/DL — LOW (ref 3.5–5)
ALP SERPL-CCNC: 219 U/L — HIGH (ref 40–120)
ALT FLD-CCNC: 61 U/L DA — HIGH (ref 10–60)
ANION GAP SERPL CALC-SCNC: 10 MMOL/L — SIGNIFICANT CHANGE UP (ref 5–17)
AST SERPL-CCNC: 77 U/L — HIGH (ref 10–40)
BASOPHILS # BLD AUTO: 0.06 K/UL — SIGNIFICANT CHANGE UP (ref 0–0.2)
BASOPHILS NFR BLD AUTO: 0.7 % — SIGNIFICANT CHANGE UP (ref 0–2)
BILIRUB SERPL-MCNC: 0.6 MG/DL — SIGNIFICANT CHANGE UP (ref 0.2–1.2)
BUN SERPL-MCNC: 12 MG/DL — SIGNIFICANT CHANGE UP (ref 7–18)
CALCIUM SERPL-MCNC: 9.5 MG/DL — SIGNIFICANT CHANGE UP (ref 8.4–10.5)
CANCER AG19-9 SERPL-ACNC: HIGH U/ML
CEA SERPL-MCNC: 127 NG/ML — HIGH (ref 0–3.8)
CHLORIDE SERPL-SCNC: 103 MMOL/L — SIGNIFICANT CHANGE UP (ref 96–108)
CO2 SERPL-SCNC: 25 MMOL/L — SIGNIFICANT CHANGE UP (ref 22–31)
CREAT SERPL-MCNC: 0.94 MG/DL — SIGNIFICANT CHANGE UP (ref 0.5–1.3)
EOSINOPHIL # BLD AUTO: 0.29 K/UL — SIGNIFICANT CHANGE UP (ref 0–0.5)
EOSINOPHIL NFR BLD AUTO: 3.4 % — SIGNIFICANT CHANGE UP (ref 0–6)
GLUCOSE SERPL-MCNC: 144 MG/DL — HIGH (ref 70–99)
HCT VFR BLD CALC: 33.5 % — LOW (ref 34.5–45)
HGB BLD-MCNC: 10.1 G/DL — LOW (ref 11.5–15.5)
IMM GRANULOCYTES NFR BLD AUTO: 0.2 % — SIGNIFICANT CHANGE UP (ref 0–1.5)
LIDOCAIN IGE QN: 8012 U/L — HIGH (ref 73–393)
LYMPHOCYTES # BLD AUTO: 1.4 K/UL — SIGNIFICANT CHANGE UP (ref 1–3.3)
LYMPHOCYTES # BLD AUTO: 16.4 % — SIGNIFICANT CHANGE UP (ref 13–44)
MAGNESIUM SERPL-MCNC: 2.4 MG/DL — SIGNIFICANT CHANGE UP (ref 1.6–2.6)
MCHC RBC-ENTMCNC: 22.4 PG — LOW (ref 27–34)
MCHC RBC-ENTMCNC: 30.1 GM/DL — LOW (ref 32–36)
MCV RBC AUTO: 74.4 FL — LOW (ref 80–100)
MONOCYTES # BLD AUTO: 1.02 K/UL — HIGH (ref 0–0.9)
MONOCYTES NFR BLD AUTO: 12 % — SIGNIFICANT CHANGE UP (ref 2–14)
NEUTROPHILS # BLD AUTO: 5.73 K/UL — SIGNIFICANT CHANGE UP (ref 1.8–7.4)
NEUTROPHILS NFR BLD AUTO: 67.3 % — SIGNIFICANT CHANGE UP (ref 43–77)
NRBC # BLD: 0 /100 WBCS — SIGNIFICANT CHANGE UP (ref 0–0)
PHOSPHATE SERPL-MCNC: 3.9 MG/DL — SIGNIFICANT CHANGE UP (ref 2.5–4.5)
PLATELET # BLD AUTO: 355 K/UL — SIGNIFICANT CHANGE UP (ref 150–400)
POTASSIUM SERPL-MCNC: 3.6 MMOL/L — SIGNIFICANT CHANGE UP (ref 3.5–5.3)
POTASSIUM SERPL-SCNC: 3.6 MMOL/L — SIGNIFICANT CHANGE UP (ref 3.5–5.3)
PROT SERPL-MCNC: 8.3 G/DL — SIGNIFICANT CHANGE UP (ref 6–8.3)
RBC # BLD: 4.5 M/UL — SIGNIFICANT CHANGE UP (ref 3.8–5.2)
RBC # FLD: 14.2 % — SIGNIFICANT CHANGE UP (ref 10.3–14.5)
SODIUM SERPL-SCNC: 138 MMOL/L — SIGNIFICANT CHANGE UP (ref 135–145)
WBC # BLD: 8.52 K/UL — SIGNIFICANT CHANGE UP (ref 3.8–10.5)
WBC # FLD AUTO: 8.52 K/UL — SIGNIFICANT CHANGE UP (ref 3.8–10.5)

## 2020-04-20 RX ADMIN — ENOXAPARIN SODIUM 50 MILLIGRAM(S): 100 INJECTION SUBCUTANEOUS at 06:25

## 2020-04-20 RX ADMIN — OXYCODONE AND ACETAMINOPHEN 1 TABLET(S): 5; 325 TABLET ORAL at 19:12

## 2020-04-20 RX ADMIN — ENOXAPARIN SODIUM 50 MILLIGRAM(S): 100 INJECTION SUBCUTANEOUS at 18:43

## 2020-04-20 NOTE — PROGRESS NOTE ADULT - ASSESSMENT
73 y/o F from home, ambulates independently, Lives with friend with a significant PMHx of HTN, HLD, Nasal Polyps, Former smoker with 20 pack history and PSH of Tubal ligation presents to the ED for chest pain, and abdominal pain. Pt is admitted to medicine for Pulmonary Embolism.

## 2020-04-20 NOTE — PROGRESS NOTE ADULT - PROBLEM SELECTOR PLAN 2
Lipase of 6829k  CT scan showed Pulmonary embolism, Pancreatic cancer with liver metastasis.  Will follow with IR for possible liver Bx  Heme onc - Dr. Haynes  f/u FOBT  Pt doesn't have PCP and wants to have a PCP appointment on discharge. Lipase of 6829k on admission  CT scan showed Pulmonary embolism, Pancreatic cancer with liver metastasis.  Will follow with IR for possible liver Bx  Heme onc - Dr. Haynes  f/u FOBT  Pt doesn't have PCP and wants to have a PCP appointment on discharge.

## 2020-04-20 NOTE — PROGRESS NOTE ADULT - ATTENDING COMMENTS
Patient seen and examined. Patient's history, vitals, labs, imaging studies reviewed. Discussed with above resident, agree with note with edits. Plan is for liver biopsy by IR, per Dr. Haynes. Plan of care discussed with patient, and agrees, all questions answered.   Jessy Parkinson MD  4/20/2020

## 2020-04-20 NOTE — PROGRESS NOTE ADULT - SUBJECTIVE AND OBJECTIVE BOX
still has pain at epigastric with radiation from left lower chest to back.  no fever, able to eat    MEDICATIONS  (STANDING):  enoxaparin Injectable 50 milliGRAM(s) SubCutaneous every 12 hours  lactated ringers. 1000 milliLiter(s) (75 mL/Hr) IV Continuous <Continuous>  simvastatin 10 milliGRAM(s) Oral at bedtime    MEDICATIONS  (PRN):  acetaminophen   Tablet .. 650 milliGRAM(s) Oral every 6 hours PRN Temp greater or equal to 38C (100.4F), Mild Pain (1 - 3)  ondansetron   Disintegrating Tablet 4 milliGRAM(s) Oral every 8 hours PRN Nausea and/or Vomiting  oxycodone    5 mG/acetaminophen 325 mG 1 Tablet(s) Oral every 6 hours PRN Severe Pain (7 - 10)      Allergies    No Known Allergies    Intolerances        Vital Signs Last 24 Hrs  T(C): 36.8 (20 Apr 2020 08:48), Max: 37.1 (19 Apr 2020 20:42)  T(F): 98.3 (20 Apr 2020 08:48), Max: 98.7 (19 Apr 2020 20:42)  HR: 97 (20 Apr 2020 08:48) (79 - 111)  BP: 110/73 (20 Apr 2020 08:48) (109/67 - 135/83)  BP(mean): --  RR: 16 (20 Apr 2020 08:48) (16 - 19)  SpO2: 98% (20 Apr 2020 08:48) (98% - 100%)    PHYSICAL EXAM  General: adult in NAD  HEENT: clear oropharynx, anicteric sclera, pink conjunctiva  Neck: supple  CV: normal S1/S2 with no murmur rubs or gallops  Lungs: positive air movement b/l ant lungs,clear to auscultation, no wheezes, no rales  Abdomen: soft non-tender non-distended, no hepatosplenomegaly  Ext: no clubbing cyanosis or edema  Skin: no rashes and no petechiae  Neuro: alert and oriented X 4, no focal deficits  LABS:                          10.1   8.52  )-----------( 355      ( 20 Apr 2020 06:53 )             33.5         Mean Cell Volume : 74.4 fl  Mean Cell Hemoglobin : 22.4 pg  Mean Cell Hemoglobin Concentration : 30.1 gm/dL  Auto Neutrophil # : 5.73 K/uL  Auto Lymphocyte # : 1.40 K/uL  Auto Monocyte # : 1.02 K/uL  Auto Eosinophil # : 0.29 K/uL  Auto Basophil # : 0.06 K/uL  Auto Neutrophil % : 67.3 %  Auto Lymphocyte % : 16.4 %  Auto Monocyte % : 12.0 %  Auto Eosinophil % : 3.4 %  Auto Basophil % : 0.7 %    Serial CBC  Hematocrit 33.5  Hemoglobin 10.1  Plat 355  RBC 4.50  WBC 8.52  Serial CBC  Hematocrit 36.7  Hemoglobin 11.4  Plat 325  RBC 4.91  WBC 8.44  Serial CBC  Hematocrit 36.6  Hemoglobin 11.4  Plat 325  RBC 4.88  WBC 9.90  Serial CBC  Hematocrit 30.1  Hemoglobin 9.5  Plat 256  RBC 4.08  WBC 9.09  Serial CBC  Hematocrit 34.6  Hemoglobin 10.7  Plat 294  RBC 4.62  WBC 8.22    04-20    138  |  103  |  12  ----------------------------<  144<H>  3.6   |  25  |  0.94    Ca    9.5      20 Apr 2020 06:53  Phos  3.9     04-20  Mg     2.4     04-20    TPro  8.3  /  Alb  3.3<L>  /  TBili  0.6  /  DBili  x   /  AST  77<H>  /  ALT  61<H>  /  AlkPhos  219<H>  04-20          Folate, Serum: 14.0 ng/mL (04-18 @ 10:11)  Vitamin B12, Serum: 1281 pg/mL (04-18 @ 10:11)  Ferritin, Serum: 2208 ng/mL (04-18 @ 10:11)  Iron - Total Binding Capacity.: 311 ug/dL (04-18 @ 06:38)            BLOOD SMEAR INTERPRETATION:       RADIOLOGY & ADDITIONAL STUDIES:

## 2020-04-20 NOTE — PROGRESS NOTE ADULT - SUBJECTIVE AND OBJECTIVE BOX
PGY1 Note discussed with supervising resident and primary attending.    Patient is a 74y old  Female who presents with a chief complaint of Abdominal pain (19 Apr 2020 12:12)      INTERVAL HPI/OVERNIGHT EVENTS:  No acute events overnight  Comfortable. On phone while evaluated at bedside.   Case discussed. All questions answered    MEDICATIONS  (STANDING):  aspirin  chewable 81 milliGRAM(s) Oral daily  enoxaparin Injectable 50 milliGRAM(s) SubCutaneous every 12 hours  lactated ringers. 1000 milliLiter(s) (75 mL/Hr) IV Continuous <Continuous>  simvastatin 10 milliGRAM(s) Oral at bedtime    MEDICATIONS  (PRN):  acetaminophen   Tablet .. 650 milliGRAM(s) Oral every 6 hours PRN Temp greater or equal to 38C (100.4F), Mild Pain (1 - 3)  ondansetron   Disintegrating Tablet 4 milliGRAM(s) Oral every 8 hours PRN Nausea and/or Vomiting  oxycodone    5 mG/acetaminophen 325 mG 1 Tablet(s) Oral every 6 hours PRN Severe Pain (7 - 10)      Allergies    No Known Allergies    Intolerances        REVIEW OF SYSTEMS:  CONSTITUTIONAL: No fever, weight loss, or fatigue  RESPIRATORY: No cough, wheezing, chills or hemoptysis; No shortness of breath  CARDIOVASCULAR: No chest pain, palpitations, dizziness, or leg swelling  GASTROINTESTINAL: No abdominal or epigastric pain. No nausea, vomiting, or hematemesis; No diarrhea or constipation. No melena or hematochezia.  NEUROLOGICAL: No headaches, memory loss, loss of strength, numbness, or tremors  SKIN: No itching, burning, rashes, or lesions     Vital Signs Last 24 Hrs  T(C): 36.8 (20 Apr 2020 04:47), Max: 37.1 (19 Apr 2020 20:42)  T(F): 98.3 (20 Apr 2020 04:47), Max: 98.7 (19 Apr 2020 20:42)  HR: 79 (20 Apr 2020 04:47) (79 - 111)  BP: 126/78 (20 Apr 2020 04:47) (109/67 - 135/83)  BP(mean): --  RR: 18 (20 Apr 2020 04:47) (18 - 19)  SpO2: 98% (20 Apr 2020 04:47) (98% - 100%)    PHYSICAL EXAM:  GENERAL: elderly, thin female NAD, well-groomed  HEAD:  Atraumatic, Normocephalic  EYES: EOMI, PERRLA, conjunctiva and sclera clear  NECK: Supple, No JVD, Normal thyroid  CHEST/LUNG: Clear to percussion bilaterally; No rales, rhonchi, wheezing, or rubs  HEART: Regular rate and rhythm; No murmurs, rubs, or gallops  ABDOMEN: Soft, Nontender, Nondistended; Bowel sounds present  NERVOUS SYSTEM:  Alert & Oriented X3, Good concentration; Motor Strength 5/5 B/L   EXTREMITIES:  2+ Peripheral Pulses, No clubbing, cyanosis, or edema  SKIN;    LABS:                        10.1   8.52  )-----------( 355      ( 20 Apr 2020 06:53 )             33.5     04-20    138  |  103  |  x   ----------------------------<  x   3.6   |  x   |  x     Ca    9.6      19 Apr 2020 06:14  Phos  4.5     04-19  Mg     2.4     04-19    TPro  8.9<H>  /  Alb  3.4<L>  /  TBili  0.6  /  DBili  x   /  AST  73<H>  /  ALT  53  /  AlkPhos  254<H>  04-19        CAPILLARY BLOOD GLUCOSE          RADIOLOGY & ADDITIONAL TESTS:    Imaging Personally Reviewed:  [ ] YES  [ ] NO    Consultant(s) Notes Reviewed:  [ ] YES  [ ] NO PGY1 Note discussed with supervising resident and primary attending.    Patient is a 74 year old  Female who presents with a chief complaint of Abdominal pain (19 Apr 2020 12:12)      INTERVAL HPI/OVERNIGHT EVENTS:  No acute events overnight  Comfortable. On phone while evaluated at bedside.   Case discussed. All questions answered    MEDICATIONS  (STANDING):  aspirin  chewable 81 milliGRAM(s) Oral daily  enoxaparin Injectable 50 milliGRAM(s) SubCutaneous every 12 hours  lactated ringers. 1000 milliLiter(s) (75 mL/Hr) IV Continuous <Continuous>  simvastatin 10 milliGRAM(s) Oral at bedtime    MEDICATIONS  (PRN):  acetaminophen   Tablet .. 650 milliGRAM(s) Oral every 6 hours PRN Temp greater or equal to 38C (100.4F), Mild Pain (1 - 3)  ondansetron   Disintegrating Tablet 4 milliGRAM(s) Oral every 8 hours PRN Nausea and/or Vomiting  oxycodone    5 mG/acetaminophen 325 mG 1 Tablet(s) Oral every 6 hours PRN Severe Pain (7 - 10)      Allergies    No Known Allergies      REVIEW OF SYSTEMS:  CONSTITUTIONAL: No fever, weight loss, has fatigue  EYES: No eye pain, visual disturbances, or discharge  ENMT:  No difficulty hearing, tinnitus, vertigo; No sinus or throat pain  NECK: No pain or stiffness  RESPIRATORY: No cough, wheezing, chills or hemoptysis; No shortness of breath  CARDIOVASCULAR: No chest pain, palpitations, dizziness, or leg swelling  GASTROINTESTINAL: No abdominal or epigastric pain. No nausea, vomiting, or hematemesis; No diarrhea or constipation. No melena or hematochezia.  GENITOURINARY: No dysuria, frequency, hematuria, or incontinence  NEUROLOGICAL: No headaches, memory loss, loss of strength, numbness, or tremors  SKIN: No itching, burning, rashes, or lesions   ENDOCRINE: No heat or cold intolerance; No hair loss  MUSCULOSKELETAL: No joint pain or swelling; No muscle, back, or extremity pain  PSYCHIATRIC: No depression, anxiety, mood swings, or difficulty sleeping  HEME/LYMPH: No easy bruising, or bleeding gums  ALLERGY AND IMMUNOLOGIC: No hives or eczema      Vital Signs Last 24 Hrs  T(C): 36.8 (20 Apr 2020 04:47), Max: 37.1 (19 Apr 2020 20:42)  T(F): 98.3 (20 Apr 2020 04:47), Max: 98.7 (19 Apr 2020 20:42)  HR: 79 (20 Apr 2020 04:47) (79 - 111)  BP: 126/78 (20 Apr 2020 04:47) (109/67 - 135/83)  RR: 18 (20 Apr 2020 04:47) (18 - 19)  SpO2: 98% (20 Apr 2020 04:47) (98% - 100%)    PHYSICAL EXAM:  GENERAL: elderly, thin female NAD, well-groomed  HEAD:  Atraumatic, Normocephalic  EYES: EOMI, PERRL, conjunctiva and sclera clear  NECK: Supple, No JVD, Normal thyroid  CHEST/LUNG: Clear to percussion bilaterally; No rales, rhonchi, wheezing, or rubs  HEART: Regular rate and rhythm; No murmurs, rubs, or gallops  ABDOMEN: Soft, Nontender, Nondistended; Bowel sounds present  NERVOUS SYSTEM:  Alert & Oriented X3, Good concentration; Motor Strength 5/5 B/L   EXTREMITIES:  2+ Peripheral Pulses, No clubbing, cyanosis, or edema  SKIN: no rash    LABS:                        10.1   8.52  )-----------( 355      ( 20 Apr 2020 06:53 )             33.5     04-20    138  |  103  |  x   ----------------------------<  x   3.6   |  x   |  x     Ca    9.6      19 Apr 2020 06:14  Phos  4.5     04-19  Mg     2.4     04-19    TPro  8.9<H>  /  Alb  3.4<L>  /  TBili  0.6  /  DBili  x   /  AST  73<H>  /  ALT  53  /  AlkPhos  254<H>  04-19      RADIOLOGY & ADDITIONAL TESTS:    Imaging  Reviewed:  [x ] YES  [ ] NO    Consultant(s) Notes Reviewed:  [x ] YES  [ ] NO

## 2020-04-20 NOTE — PROGRESS NOTE ADULT - PROBLEM SELECTOR PLAN 7
- K is 3.2 on admission,   - likely secondary to poor PO intake  Resolved  - Continue to monitor electrolytes.  - f/u BMP daily - K is 3.2 on admission, replaced and corrected  - likely secondary to poor PO intake  Resolved  - Continue to monitor electrolytes.  - f/u BMP daily

## 2020-04-20 NOTE — PROGRESS NOTE ADULT - PROBLEM SELECTOR PLAN 1
Problem: Pulmonary embolism.   D dimer of 14k  Most likely due to Pancreatic cancer   CT scan showed Pulmonary embolism  -She was started on Heparin drip  -transition to Lovenox  -PESI score 102 based on initial VS, Class 3, Intermediate Risk: 3.2-7% 30-day mortality.  check TTE  Doppler venous u/s  currently saturating on RA Problem: Pulmonary embolism.   D dimer of 14k  Most likely due to Pancreatic cancer   CT scan showed Pulmonary embolism  -She was started on Heparin drip  -transitioned to Lovenox  -PESI score 102 based on initial VS, Class 3, Intermediate Risk: 3.2-7% 30-day mortality.  check TTE  Doppler venous u/s  currently saturating on RA

## 2020-04-20 NOTE — PROGRESS NOTE ADULT - ASSESSMENT
· Assessment		  74 year old female presented with abdominal pain and chest pain for 12 days with weight loss.  CT showed pancreatic mass, liver mets and PE.    Problem/Recommendation - 1:  Problem: Malignant neoplasm of pancreas, unspecified location of malignancy. Recommendation: with liver mets  will f/u on Ca 19.9 and CEA  the abdominal pain can be from liver mets or pancreatitis due to obstructed pancreatic duct  discuss liver biopsy with her and she agreed  discussed with IR  joe goldstein asa    Problem/Recommendation - 2:  ·  Problem: Pulmonary embolism.  Recommendation: can start lovenox  can change to NOAC after iiver biopsy

## 2020-04-20 NOTE — PROGRESS NOTE ADULT - PROBLEM SELECTOR PLAN 3
Pt is asymptomatic, no dysuria, no increased frequency of micturition, no hematuria.  AFebrile,  NO Leucocytosis  Positive UA, check Ucx  - completed course of rocephin  - Will  c/w maintenance ivf  f/u Urine cultures Pt is asymptomatic, no dysuria, no increased frequency of micturition, no hematuria.  AFebrile,  NO Leucocytosis  Positive UA,  completed course of rocephin  c/w maintenance ivf

## 2020-04-21 LAB
ANION GAP SERPL CALC-SCNC: 5 MMOL/L — SIGNIFICANT CHANGE UP (ref 5–17)
BUN SERPL-MCNC: 13 MG/DL — SIGNIFICANT CHANGE UP (ref 7–18)
CALCIUM SERPL-MCNC: 9.2 MG/DL — SIGNIFICANT CHANGE UP (ref 8.4–10.5)
CHLORIDE SERPL-SCNC: 104 MMOL/L — SIGNIFICANT CHANGE UP (ref 96–108)
CO2 SERPL-SCNC: 29 MMOL/L — SIGNIFICANT CHANGE UP (ref 22–31)
CREAT SERPL-MCNC: 0.72 MG/DL — SIGNIFICANT CHANGE UP (ref 0.5–1.3)
GLUCOSE SERPL-MCNC: 122 MG/DL — HIGH (ref 70–99)
HCT VFR BLD CALC: 30.3 % — LOW (ref 34.5–45)
HGB BLD-MCNC: 9.4 G/DL — LOW (ref 11.5–15.5)
LIDOCAIN IGE QN: 8829 U/L — HIGH (ref 73–393)
MCHC RBC-ENTMCNC: 22.9 PG — LOW (ref 27–34)
MCHC RBC-ENTMCNC: 31 GM/DL — LOW (ref 32–36)
MCV RBC AUTO: 73.7 FL — LOW (ref 80–100)
NRBC # BLD: 0 /100 WBCS — SIGNIFICANT CHANGE UP (ref 0–0)
PLATELET # BLD AUTO: 334 K/UL — SIGNIFICANT CHANGE UP (ref 150–400)
POTASSIUM SERPL-MCNC: 3.8 MMOL/L — SIGNIFICANT CHANGE UP (ref 3.5–5.3)
POTASSIUM SERPL-SCNC: 3.8 MMOL/L — SIGNIFICANT CHANGE UP (ref 3.5–5.3)
RBC # BLD: 4.11 M/UL — SIGNIFICANT CHANGE UP (ref 3.8–5.2)
RBC # FLD: 13.8 % — SIGNIFICANT CHANGE UP (ref 10.3–14.5)
SODIUM SERPL-SCNC: 138 MMOL/L — SIGNIFICANT CHANGE UP (ref 135–145)
WBC # BLD: 8.45 K/UL — SIGNIFICANT CHANGE UP (ref 3.8–10.5)
WBC # FLD AUTO: 8.45 K/UL — SIGNIFICANT CHANGE UP (ref 3.8–10.5)

## 2020-04-21 RX ORDER — SODIUM CHLORIDE 9 MG/ML
1000 INJECTION, SOLUTION INTRAVENOUS
Refills: 0 | Status: DISCONTINUED | OUTPATIENT
Start: 2020-04-21 | End: 2020-04-22

## 2020-04-21 RX ORDER — LANOLIN ALCOHOL/MO/W.PET/CERES
5 CREAM (GRAM) TOPICAL AT BEDTIME
Refills: 0 | Status: DISCONTINUED | OUTPATIENT
Start: 2020-04-21 | End: 2020-04-25

## 2020-04-21 RX ADMIN — Medication 5 MILLIGRAM(S): at 21:50

## 2020-04-21 RX ADMIN — ENOXAPARIN SODIUM 50 MILLIGRAM(S): 100 INJECTION SUBCUTANEOUS at 17:09

## 2020-04-21 RX ADMIN — SODIUM CHLORIDE 100 MILLILITER(S): 9 INJECTION, SOLUTION INTRAVENOUS at 11:57

## 2020-04-21 RX ADMIN — ENOXAPARIN SODIUM 50 MILLIGRAM(S): 100 INJECTION SUBCUTANEOUS at 05:35

## 2020-04-21 RX ADMIN — SIMVASTATIN 10 MILLIGRAM(S): 20 TABLET, FILM COATED ORAL at 21:50

## 2020-04-21 RX ADMIN — SODIUM CHLORIDE 100 MILLILITER(S): 9 INJECTION, SOLUTION INTRAVENOUS at 18:45

## 2020-04-21 NOTE — PROGRESS NOTE ADULT - PROBLEM SELECTOR PLAN 7
- K is 3.2 on admission, replaced and corrected  - likely secondary to poor PO intake  Resolved  - Continue to monitor electrolytes.  - f/u BMP daily

## 2020-04-21 NOTE — PROGRESS NOTE ADULT - SUBJECTIVE AND OBJECTIVE BOX
PGY1 Note discussed with supervising resident and primary attending.    Patient is a 74 year old  Female who presents with a chief complaint of Abdominal pain (19 Apr 2020 12:12)      INTERVAL HPI/OVERNIGHT EVENTS:  No acute events overnight  Comfortable. Case discussed extensively. All questions answered.  Plan for liver bx with IR on Friday 4/24.     MEDICATIONS  (STANDING):  enoxaparin Injectable 50 milliGRAM(s) SubCutaneous every 12 hours  lactated ringers. 1000 milliLiter(s) (100 mL/Hr) IV Continuous <Continuous>  lactated ringers. 1000 milliLiter(s) (75 mL/Hr) IV Continuous <Continuous>  simvastatin 10 milliGRAM(s) Oral at bedtime    MEDICATIONS  (PRN):  acetaminophen   Tablet .. 650 milliGRAM(s) Oral every 6 hours PRN Temp greater or equal to 38C (100.4F), Mild Pain (1 - 3)  ondansetron   Disintegrating Tablet 4 milliGRAM(s) Oral every 8 hours PRN Nausea and/or Vomiting  oxycodone    5 mG/acetaminophen 325 mG 1 Tablet(s) Oral every 6 hours PRN Severe Pain (7 - 10)      Allergies    No Known Allergies      Vital Signs Last 24 Hrs  T(C): 36.9 (21 Apr 2020 04:53), Max: 37.4 (20 Apr 2020 19:51)  T(F): 98.4 (21 Apr 2020 04:53), Max: 99.3 (20 Apr 2020 19:51)  HR: 91 (21 Apr 2020 04:53) (89 - 99)  BP: 107/66 (21 Apr 2020 04:53) (107/66 - 151/83)  BP(mean): --  RR: 18 (21 Apr 2020 04:53) (17 - 18)  SpO2: 100% (21 Apr 2020 04:53) (99% - 100%)    PHYSICAL EXAM:  GENERAL: elderly, thin female NAD, well-groomed  HEAD:  Atraumatic, Normocephalic  EYES: EOMI, PERRL, conjunctiva and sclera clear  NECK: Supple, No JVD, Normal thyroid  CHEST/LUNG: Clear to percussion bilaterally; No rales, rhonchi, wheezing, or rubs  HEART: Regular rate and rhythm; No murmurs, rubs, or gallops  ABDOMEN: Soft, mild epigastric tenderness Nondistended; Bowel sounds present  NERVOUS SYSTEM:  Alert & Oriented X3, Good concentration; Motor Strength 5/5 B/L   EXTREMITIES:  2+ Peripheral Pulses, No clubbing, cyanosis, or edema  SKIN: no rash      LABS:  cret                        9.4    8.45  )-----------( 334      ( 21 Apr 2020 06:10 )             30.3     04-21    138  |  104  |  13  ----------------------------<  122<H>  3.8   |  29  |  0.72    Ca    9.2      21 Apr 2020 06:10  Phos  3.9     04-20  Mg     2.4     04-20    TPro  8.3  /  Alb  3.3<L>  /  TBili  0.6  /  DBili  x   /  AST  77<H>  /  ALT  61<H>  /  AlkPhos  219<H>  04-20      RADIOLOGY & ADDITIONAL TESTS:    Imaging  Reviewed:  [x ] YES  [ ] NO    Consultant(s) Notes Reviewed:  [x ] YES  [ ] NO PGY1 Note discussed with supervising resident and primary attending.    Patient is a 74 year old  Female who presents with a chief complaint of Abdominal pain (19 Apr 2020 12:12)      INTERVAL HPI/OVERNIGHT EVENTS:  No acute events overnight  Comfortable. Case discussed extensively. All questions answered.  Plan for liver biopsy with IR on Friday 4/24.     MEDICATIONS  (STANDING):  enoxaparin Injectable 50 milliGRAM(s) SubCutaneous every 12 hours  lactated ringers. 1000 milliLiter(s) (100 mL/Hr) IV Continuous <Continuous>  lactated ringers. 1000 milliLiter(s) (75 mL/Hr) IV Continuous <Continuous>  simvastatin 10 milliGRAM(s) Oral at bedtime    MEDICATIONS  (PRN):  acetaminophen   Tablet .. 650 milliGRAM(s) Oral every 6 hours PRN Temp greater or equal to 38C (100.4F), Mild Pain (1 - 3)  ondansetron   Disintegrating Tablet 4 milliGRAM(s) Oral every 8 hours PRN Nausea and/or Vomiting  oxycodone    5 mG/acetaminophen 325 mG 1 Tablet(s) Oral every 6 hours PRN Severe Pain (7 - 10)      Allergies: No Known Allergies    REVIEW OF SYSTEMS:  CONSTITUTIONAL: No fever, weight loss, has fatigue  EYES: No eye pain, visual disturbances, or discharge  ENMT:  No difficulty hearing, tinnitus, vertigo; No sinus or throat pain  NECK: No pain or stiffness  RESPIRATORY: No cough, wheezing, chills or hemoptysis; No shortness of breath  CARDIOVASCULAR: No chest pain, palpitations, dizziness, or leg swelling  GASTROINTESTINAL: No abdominal or epigastric pain. No nausea, vomiting, or hematemesis; No diarrhea or constipation. No melena or hematochezia.  GENITOURINARY: No dysuria, frequency, hematuria, or incontinence  NEUROLOGICAL: No headaches, memory loss, loss of strength, numbness, or tremors  SKIN: No itching, burning, rashes, or lesions   ENDOCRINE: No heat or cold intolerance; No hair loss  MUSCULOSKELETAL: No joint pain or swelling; No muscle, back, or extremity pain  PSYCHIATRIC: No depression, anxiety, mood swings, or difficulty sleeping  HEME/LYMPH: No easy bruising, or bleeding gums  ALLERGY AND IMMUNOLOGIC: No hives or eczema      Vital Signs Last 24 Hrs  T(C): 36.9 (21 Apr 2020 04:53), Max: 37.4 (20 Apr 2020 19:51)  T(F): 98.4 (21 Apr 2020 04:53), Max: 99.3 (20 Apr 2020 19:51)  HR: 91 (21 Apr 2020 04:53) (89 - 99)  BP: 107/66 (21 Apr 2020 04:53) (107/66 - 151/83)  RR: 18 (21 Apr 2020 04:53) (17 - 18)  SpO2: 100% (21 Apr 2020 04:53) (99% - 100%)    PHYSICAL EXAM:  GENERAL: elderly, thin female NAD, well-groomed  HEAD:  Atraumatic, Normocephalic  EYES: EOMI, PERRL, conjunctiva and sclera clear  NECK: Supple, No JVD, Normal thyroid  CHEST/LUNG: Clear to percussion bilaterally; No rales, rhonchi, wheezing, or rubs  HEART: Regular rate and rhythm; No murmurs, rubs, or gallops  ABDOMEN: Soft, mild epigastric tenderness Nondistended; Bowel sounds present  NERVOUS SYSTEM:  Alert & Oriented X3, Good concentration; Motor Strength 5/5 B/L   EXTREMITIES:  2+ Peripheral Pulses, No clubbing, cyanosis, or edema  SKIN: no rash      LABS:                          9.4    8.45  )-----------( 334      ( 21 Apr 2020 06:10 )             30.3     04-21    138  |  104  |  13  ----------------------------<  122<H>  3.8   |  29  |  0.72    Ca    9.2      21 Apr 2020 06:10  Phos  3.9     04-20  Mg     2.4     04-20    TPro  8.3  /  Alb  3.3<L>  /  TBili  0.6  /  DBili  x   /  AST  77<H>  /  ALT  61<H>  /  AlkPhos  219<H>  04-20      RADIOLOGY & ADDITIONAL TESTS:    Imaging  Reviewed:  [x ] YES  [ ] NO    Consultant(s) Notes Reviewed:  [x ] YES  [ ] NO

## 2020-04-21 NOTE — PROGRESS NOTE ADULT - PROBLEM SELECTOR PLAN 1
Problem: Pulmonary embolism.   D dimer of 14k  Most likely due to Pancreatic cancer   CT scan showed Pulmonary embolism  -She was started on Heparin drip  -transitioned to Lovenox  -PESI score 102 based on initial VS, Class 3, Intermediate Risk: 3.2-7% 30-day mortality.  check TTE  Doppler venous u/s  currently saturating on RA

## 2020-04-21 NOTE — PROGRESS NOTE ADULT - ATTENDING COMMENTS
Patient seen and examined. Patient's history, vitals, labs, imaging studies reviewed. Discussed with above resident, agree with note with edits. Plan is for liver biopsy by IR on Friday. Plan of care discussed with patient, and agrees, all questions answered.   Jessy Parkinson MD  4/21/2020

## 2020-04-21 NOTE — PROGRESS NOTE ADULT - ASSESSMENT
75 y/o F from home, ambulates independently, Lives with friend with a significant PMHx of HTN, HLD, Nasal Polyps, Former smoker with 20 pack history and PSH of Tubal ligation presents to the ED for chest pain, and abdominal pain. Pt is admitted to medicine for Pulmonary Embolism.

## 2020-04-21 NOTE — PROGRESS NOTE ADULT - PROBLEM SELECTOR PLAN 3
Pt is asymptomatic, no dysuria, no increased frequency of micturition, no hematuria.  AFebrile,  NO Leucocytosis  Positive UA,  completed course of rocephin  c/w maintenance ivf Pt is asymptomatic, no dysuria, no increased frequency of micturition, no hematuria.  AFebrile,  NO Leucocytosis  Positive UA,  completed course of IV rocephin  c/w maintenance ivf

## 2020-04-21 NOTE — PROGRESS NOTE ADULT - PROBLEM SELECTOR PLAN 2
Lipase of 6829k on admission, now increased to 8000 with abdominal pain/tenderness  CT scan showed Pulmonary embolism, Pancreatic cancer with liver metastasis.  IR for liver Bx on Friday 4/25 as patient had aspirin over weekend. Hold lovenox day prior.  Heme onc - Dr. Haynes  f/u FOBT  Pt doesn't have PCP and wants to have a PCP appointment on discharge.

## 2020-04-21 NOTE — PROGRESS NOTE ADULT - ASSESSMENT
· Assessment		  74 year old female presented with abdominal pain and chest pain for 12 days with weight loss.  CT showed pancreatic mass, liver mets and PE.    Problem/Recommendation - 1:  Problem: Malignant neoplasm of pancreas, unspecified location of malignancy. Recommendation: with liver mets  will f/u on Ca 19.9 and CEA  the abdominal pain can be from liver mets or pancreatitis due to obstructed pancreatic duct  discuss liver biopsy with her and she agreed  discussed with IR  will dc asa  biopsy scheduled for Friday  abd pain gets worse  will be NPO for pancreatitis    Problem/Recommendation - 2:  ·  Problem: Pulmonary embolism.  Recommendation: can start lovenox  can change to NOAC after iiver biopsy

## 2020-04-21 NOTE — PROGRESS NOTE ADULT - SUBJECTIVE AND OBJECTIVE BOX
had more pain last night  no sob  she was on ASA  liver biopsy postponed    MEDICATIONS  (STANDING):  enoxaparin Injectable 50 milliGRAM(s) SubCutaneous every 12 hours  lactated ringers. 1000 milliLiter(s) (100 mL/Hr) IV Continuous <Continuous>  lactated ringers. 1000 milliLiter(s) (75 mL/Hr) IV Continuous <Continuous>  melatonin 5 milliGRAM(s) Oral at bedtime  simvastatin 10 milliGRAM(s) Oral at bedtime    MEDICATIONS  (PRN):  acetaminophen   Tablet .. 650 milliGRAM(s) Oral every 6 hours PRN Temp greater or equal to 38C (100.4F), Mild Pain (1 - 3)  ondansetron   Disintegrating Tablet 4 milliGRAM(s) Oral every 8 hours PRN Nausea and/or Vomiting  oxycodone    5 mG/acetaminophen 325 mG 1 Tablet(s) Oral every 6 hours PRN Severe Pain (7 - 10)      Allergies    No Known Allergies    Intolerances        Vital Signs Last 24 Hrs  T(C): 36.9 (21 Apr 2020 04:53), Max: 37.4 (20 Apr 2020 19:51)  T(F): 98.4 (21 Apr 2020 04:53), Max: 99.3 (20 Apr 2020 19:51)  HR: 91 (21 Apr 2020 04:53) (89 - 96)  BP: 107/66 (21 Apr 2020 04:53) (107/66 - 151/83)  BP(mean): --  RR: 18 (21 Apr 2020 04:53) (17 - 18)  SpO2: 100% (21 Apr 2020 04:53) (99% - 100%)    PHYSICAL EXAM  General: adult in NAD  HEENT: clear oropharynx, anicteric sclera, pink conjunctiva  Neck: supple  CV: normal S1/S2 with no murmur rubs or gallops  Lungs: positive air movement b/l ant lungs,clear to auscultation, no wheezes, no rales  Abdomen: soft non-tender non-distended, no hepatosplenomegaly  Ext: no clubbing cyanosis or edema  Skin: no rashes and no petechiae  Neuro: alert and oriented X 4, no focal deficits  LABS:                          9.4    8.45  )-----------( 334      ( 21 Apr 2020 06:10 )             30.3         Mean Cell Volume : 73.7 fl  Mean Cell Hemoglobin : 22.9 pg  Mean Cell Hemoglobin Concentration : 31.0 gm/dL  Auto Neutrophil # : x  Auto Lymphocyte # : x  Auto Monocyte # : x  Auto Eosinophil # : x  Auto Basophil # : x  Auto Neutrophil % : x  Auto Lymphocyte % : x  Auto Monocyte % : x  Auto Eosinophil % : x  Auto Basophil % : x    Serial CBC  Hematocrit 30.3  Hemoglobin 9.4  Plat 334  RBC 4.11  WBC 8.45  Serial CBC  Hematocrit 33.5  Hemoglobin 10.1  Plat 355  RBC 4.50  WBC 8.52  Serial CBC  Hematocrit 36.7  Hemoglobin 11.4  Plat 325  RBC 4.91  WBC 8.44  Serial CBC  Hematocrit 36.6  Hemoglobin 11.4  Plat 325  RBC 4.88  WBC 9.90  Serial CBC  Hematocrit 30.1  Hemoglobin 9.5  Plat 256  RBC 4.08  WBC 9.09    04-21    138  |  104  |  13  ----------------------------<  122<H>  3.8   |  29  |  0.72    Ca    9.2      21 Apr 2020 06:10  Phos  3.9     04-20  Mg     2.4     04-20    TPro  8.3  /  Alb  3.3<L>  /  TBili  0.6  /  DBili  x   /  AST  77<H>  /  ALT  61<H>  /  AlkPhos  219<H>  04-20          Folate, Serum: 14.0 ng/mL (04-18 @ 10:11)  Vitamin B12, Serum: 1281 pg/mL (04-18 @ 10:11)  Ferritin, Serum: 2208 ng/mL (04-18 @ 10:11)  Iron - Total Binding Capacity.: 311 ug/dL (04-18 @ 06:38)            BLOOD SMEAR INTERPRETATION:       RADIOLOGY & ADDITIONAL STUDIES:

## 2020-04-22 LAB
ANION GAP SERPL CALC-SCNC: 10 MMOL/L — SIGNIFICANT CHANGE UP (ref 5–17)
BUN SERPL-MCNC: 11 MG/DL — SIGNIFICANT CHANGE UP (ref 7–18)
CALCIUM SERPL-MCNC: 8.8 MG/DL — SIGNIFICANT CHANGE UP (ref 8.4–10.5)
CHLORIDE SERPL-SCNC: 102 MMOL/L — SIGNIFICANT CHANGE UP (ref 96–108)
CO2 SERPL-SCNC: 27 MMOL/L — SIGNIFICANT CHANGE UP (ref 22–31)
CREAT SERPL-MCNC: 0.68 MG/DL — SIGNIFICANT CHANGE UP (ref 0.5–1.3)
CULTURE RESULTS: SIGNIFICANT CHANGE UP
CULTURE RESULTS: SIGNIFICANT CHANGE UP
GLUCOSE SERPL-MCNC: 82 MG/DL — SIGNIFICANT CHANGE UP (ref 70–99)
HCT VFR BLD CALC: 30.9 % — LOW (ref 34.5–45)
HGB BLD-MCNC: 9.5 G/DL — LOW (ref 11.5–15.5)
LIDOCAIN IGE QN: 9457 U/L — HIGH (ref 73–393)
MCHC RBC-ENTMCNC: 23.1 PG — LOW (ref 27–34)
MCHC RBC-ENTMCNC: 30.7 GM/DL — LOW (ref 32–36)
MCV RBC AUTO: 75 FL — LOW (ref 80–100)
NRBC # BLD: 0 /100 WBCS — SIGNIFICANT CHANGE UP (ref 0–0)
PLATELET # BLD AUTO: 344 K/UL — SIGNIFICANT CHANGE UP (ref 150–400)
POTASSIUM SERPL-MCNC: 4 MMOL/L — SIGNIFICANT CHANGE UP (ref 3.5–5.3)
POTASSIUM SERPL-SCNC: 4 MMOL/L — SIGNIFICANT CHANGE UP (ref 3.5–5.3)
RBC # BLD: 4.12 M/UL — SIGNIFICANT CHANGE UP (ref 3.8–5.2)
RBC # FLD: 14.4 % — SIGNIFICANT CHANGE UP (ref 10.3–14.5)
SODIUM SERPL-SCNC: 139 MMOL/L — SIGNIFICANT CHANGE UP (ref 135–145)
SPECIMEN SOURCE: SIGNIFICANT CHANGE UP
SPECIMEN SOURCE: SIGNIFICANT CHANGE UP
WBC # BLD: 8.54 K/UL — SIGNIFICANT CHANGE UP (ref 3.8–10.5)
WBC # FLD AUTO: 8.54 K/UL — SIGNIFICANT CHANGE UP (ref 3.8–10.5)

## 2020-04-22 RX ORDER — SODIUM CHLORIDE 9 MG/ML
1000 INJECTION, SOLUTION INTRAVENOUS
Refills: 0 | Status: DISCONTINUED | OUTPATIENT
Start: 2020-04-22 | End: 2020-04-23

## 2020-04-22 RX ADMIN — OXYCODONE AND ACETAMINOPHEN 1 TABLET(S): 5; 325 TABLET ORAL at 03:52

## 2020-04-22 RX ADMIN — Medication 5 MILLIGRAM(S): at 21:35

## 2020-04-22 RX ADMIN — OXYCODONE AND ACETAMINOPHEN 1 TABLET(S): 5; 325 TABLET ORAL at 16:53

## 2020-04-22 RX ADMIN — SODIUM CHLORIDE 100 MILLILITER(S): 9 INJECTION, SOLUTION INTRAVENOUS at 08:22

## 2020-04-22 RX ADMIN — ENOXAPARIN SODIUM 50 MILLIGRAM(S): 100 INJECTION SUBCUTANEOUS at 05:11

## 2020-04-22 RX ADMIN — SODIUM CHLORIDE 100 MILLILITER(S): 9 INJECTION, SOLUTION INTRAVENOUS at 21:36

## 2020-04-22 RX ADMIN — ENOXAPARIN SODIUM 50 MILLIGRAM(S): 100 INJECTION SUBCUTANEOUS at 17:27

## 2020-04-22 RX ADMIN — SIMVASTATIN 10 MILLIGRAM(S): 20 TABLET, FILM COATED ORAL at 21:35

## 2020-04-22 NOTE — PROGRESS NOTE ADULT - ASSESSMENT
73 y/o F from home with pulmonary embolism, pancreatitis both from pancreatic neoplasm with metastatic disease to liver. No DVT

## 2020-04-22 NOTE — PROGRESS NOTE ADULT - ATTENDING COMMENTS
PHYSICAL EXAM:  GENERAL: elderly, thin female NAD, well-groomed  HEAD:  Atraumatic, Normocephalic  EYES: EOMI, PERRL, conjunctiva and sclera clear  NECK: Supple, No JVD, Normal thyroid  CHEST/LUNG: Clear to percussion bilaterally; No rales, rhonchi, wheezing, or rubs  HEART: Regular rate and rhythm; No murmurs, rubs, or gallops  ABDOMEN: Soft, mild epigastric tenderness Nondistended; Bowel sounds present  NERVOUS SYSTEM:  Alert & Oriented X3, Good concentration; Motor Strength 5/5 B/L   EXTREMITIES:  2+ Peripheral Pulses, No clubbing, cyanosis, or edema  SKIN: no rash    > HTN (hypertension) - BP stable   > Hyperlipidemia - continue with statin  > Anemia - H/H stable  > Moderate protein-calorie malnutrition.       Patient seen and examined. Patient's history, vitals, labs, imaging studies reviewed. Discussed with above PA, agree with note with edits. Plan of care discussed with patient, and agrees, all questions answered.   Jessy Parkinson MD  4/22/2020 PHYSICAL EXAM:  GENERAL: elderly, thin female NAD, well-groomed  HEAD:  Atraumatic, Normocephalic  EYES: EOMI, PERRL, conjunctiva and sclera clear  NECK: Supple, No JVD, Normal thyroid  CHEST/LUNG: Clear to percussion bilaterally; No rales, rhonchi, wheezing, or rubs  HEART: Regular rate and rhythm; No murmurs, rubs, or gallops  ABDOMEN: Soft, mild epigastric tenderness Nondistended; Bowel sounds present  NERVOUS SYSTEM:  Alert & Oriented X3, non-focal  EXTREMITIES:  2+ Peripheral Pulses, No clubbing, cyanosis, or edema  SKIN: no rash    > HTN (hypertension) - BP stable   > Hyperlipidemia - continue with statin  > Anemia - H/H stable  > Moderate protein-calorie malnutrition.       Patient seen and examined. Patient's history, vitals, labs, imaging studies reviewed. Discussed with above PA, agree with note with edits. Plan is for liver biopsy on Friday. Plan of care discussed with patient, and agrees, all questions answered.   Jessy Parkinson MD  4/22/2020

## 2020-04-22 NOTE — PROGRESS NOTE ADULT - SUBJECTIVE AND OBJECTIVE BOX
Pt seen at bedside  Patient is a 74y old  Female who presents with a chief complaint of Abdominal pain (21 Apr 2020 11:50)      INTERVAL HPI/OVERNIGHT EVENTS:  States abdominal pain much improved since not eating  No other complaints at this time    Vital Signs Last 24 Hrs  T(C): 36.6 (22 Apr 2020 05:20), Max: 37.1 (21 Apr 2020 14:00)  T(F): 97.8 (22 Apr 2020 05:20), Max: 98.8 (21 Apr 2020 14:00)  HR: 78 (22 Apr 2020 05:20) (77 - 94)  BP: 112/72 (22 Apr 2020 05:20) (105/70 - 135/83)  BP(mean): --  RR: 17 (22 Apr 2020 05:20) (17 - 19)  SpO2: 100% (22 Apr 2020 05:20) (98% - 100%)    Physical Exam:    Gen: AOx 3, NAD  HEENT: anicteric  Chest: equal chest rise, no accessory muscle use  Abd: flat, soft  Ext: warm to touch, no calf tenderness bilaterally    MEDICATIONS  (STANDING):  enoxaparin Injectable 50 milliGRAM(s) SubCutaneous every 12 hours  lactated ringers. 1000 milliLiter(s) (100 mL/Hr) IV Continuous <Continuous>  melatonin 5 milliGRAM(s) Oral at bedtime  simvastatin 10 milliGRAM(s) Oral at bedtime    MEDICATIONS  (PRN):  acetaminophen   Tablet .. 650 milliGRAM(s) Oral every 6 hours PRN Temp greater or equal to 38C (100.4F), Mild Pain (1 - 3)  ondansetron   Disintegrating Tablet 4 milliGRAM(s) Oral every 8 hours PRN Nausea and/or Vomiting  oxycodone    5 mG/acetaminophen 325 mG 1 Tablet(s) Oral every 6 hours PRN Severe Pain (7 - 10)                            9.5    8.54  )-----------( 344      ( 22 Apr 2020 05:09 )             30.9     04-22    139  |  102  |  11  ----------------------------<  82  4.0   |  27  |  0.68    Ca    8.8      22 Apr 2020 05:09          I&O's Detail    21 Apr 2020 07:01  -  22 Apr 2020 07:00  --------------------------------------------------------  IN:    lactated ringers.: 300 mL  Total IN: 300 mL    OUT:  Total OUT: 0 mL    Total NET: 300 mL Patient is a 74 year old  Female who presents with a chief complaint of Abdominal pain (21 Apr 2020 11:50)      INTERVAL HPI/OVERNIGHT EVENTS: Pt seen at bedside  States abdominal pain much improved since not eating  No other complaints at this time    MEDICATIONS  (STANDING):  enoxaparin Injectable 50 milliGRAM(s) SubCutaneous every 12 hours  lactated ringers. 1000 milliLiter(s) (100 mL/Hr) IV Continuous <Continuous>  melatonin 5 milliGRAM(s) Oral at bedtime  simvastatin 10 milliGRAM(s) Oral at bedtime    MEDICATIONS  (PRN):  acetaminophen   Tablet .. 650 milliGRAM(s) Oral every 6 hours PRN Temp greater or equal to 38C (100.4F), Mild Pain (1 - 3)  ondansetron   Disintegrating Tablet 4 milliGRAM(s) Oral every 8 hours PRN Nausea and/or Vomiting  oxycodone    5 mG/acetaminophen 325 mG 1 Tablet(s) Oral every 6 hours PRN Severe Pain (7 - 10)    REVIEW OF SYSTEMS:  CONSTITUTIONAL: No fever, weight loss, has fatigue  EYES: No eye pain, visual disturbances, or discharge  ENMT:  No difficulty hearing, tinnitus, vertigo; No sinus or throat pain  NECK: No pain or stiffness  RESPIRATORY: No cough, wheezing, chills or hemoptysis; No shortness of breath  CARDIOVASCULAR: No chest pain, palpitations, dizziness, or leg swelling  GASTROINTESTINAL: No abdominal or epigastric pain. No nausea, vomiting, or hematemesis; No diarrhea or constipation. No melena or hematochezia.  GENITOURINARY: No dysuria, frequency, hematuria, or incontinence  NEUROLOGICAL: No headaches, memory loss, loss of strength, numbness, or tremors  SKIN: No itching, burning, rashes, or lesions   ENDOCRINE: No heat or cold intolerance; No hair loss  MUSCULOSKELETAL: No joint pain or swelling; No muscle, back, or extremity pain  PSYCHIATRIC: No depression, anxiety, mood swings, or difficulty sleeping  HEME/LYMPH: No easy bruising, or bleeding gums  ALLERGY AND IMMUNOLOGIC: No hives or eczema      Vital Signs Last 24 Hrs  T(C): 36.6 (22 Apr 2020 05:20), Max: 37.1 (21 Apr 2020 14:00)  T(F): 97.8 (22 Apr 2020 05:20), Max: 98.8 (21 Apr 2020 14:00)  HR: 78 (22 Apr 2020 05:20) (77 - 94)  BP: 112/72 (22 Apr 2020 05:20) (105/70 - 135/83)  RR: 17 (22 Apr 2020 05:20) (17 - 19)  SpO2: 100% (22 Apr 2020 05:20) (98% - 100%)      I&O's Detail    21 Apr 2020 07:01  -  22 Apr 2020 07:00  --------------------------------------------------------  IN:    lactated ringers.: 300 mL  Total IN: 300 mL    OUT:  Total OUT: 0 mL    Total NET: 300 mL        Physical Exam:    Gen: AOx 3, NAD  HEENT: anicteric  Chest: equal chest rise, no accessory muscle use  Abd: flat, soft  Ext: warm to touch, no calf tenderness bilaterally    LABS:                          9.5    8.54  )-----------( 344      ( 22 Apr 2020 05:09 )             30.9     04-22    139  |  102  |  11  ----------------------------<  82  4.0   |  27  |  0.68    Ca    8.8      22 Apr 2020 05:09

## 2020-04-22 NOTE — PROGRESS NOTE ADULT - ASSESSMENT
· Assessment		  74 year old female presented with abdominal pain and chest pain for 12 days with weight loss.  CT showed pancreatic mass, liver mets and PE.    Problem/Recommendation - 1:  Problem: Malignant neoplasm of pancreas, unspecified location of malignancy. Recommendation: with liver mets  will f/u on Ca 19.9 and CEA, ca 19.9 is 10,000  the abdominal pain can be from liver mets or pancreatitis due to obstructed pancreatic duct  discuss liver biopsy with her and she agreed  discussed with IR  will dc asa  biopsy scheduled for Friday  abd pain is less since NPO  will continue NPO for pancreatitis    Problem/Recommendation - 2:  ·  Problem: Pulmonary embolism.  Recommendation: can start lovenox  c

## 2020-04-22 NOTE — PROGRESS NOTE ADULT - PROBLEM SELECTOR PLAN 2
IR for liver Bx on Friday 4/25 as patient had aspirin over weekend. Hold lovenox day prior.  Heme onc - Dr. Haynes IR for liver Bx on Friday 4/25 as patient had aspirin over weekend. Hold lovenox day prior.  Heme onc - Dr. Haynes  Pt NPO will d/w attending Re: TPN if prolonged NPO

## 2020-04-22 NOTE — PROGRESS NOTE ADULT - SUBJECTIVE AND OBJECTIVE BOX
feel better,   abd pain is much less  NPO now     MEDICATIONS  (STANDING):  enoxaparin Injectable 50 milliGRAM(s) SubCutaneous every 12 hours  lactated ringers. 1000 milliLiter(s) (100 mL/Hr) IV Continuous <Continuous>  melatonin 5 milliGRAM(s) Oral at bedtime  simvastatin 10 milliGRAM(s) Oral at bedtime    MEDICATIONS  (PRN):  acetaminophen   Tablet .. 650 milliGRAM(s) Oral every 6 hours PRN Temp greater or equal to 38C (100.4F), Mild Pain (1 - 3)  ondansetron   Disintegrating Tablet 4 milliGRAM(s) Oral every 8 hours PRN Nausea and/or Vomiting  oxycodone    5 mG/acetaminophen 325 mG 1 Tablet(s) Oral every 6 hours PRN Severe Pain (7 - 10)      Allergies    No Known Allergies    Intolerances        Vital Signs Last 24 Hrs  T(C): 36.6 (22 Apr 2020 05:20), Max: 37.1 (21 Apr 2020 14:00)  T(F): 97.8 (22 Apr 2020 05:20), Max: 98.8 (21 Apr 2020 14:00)  HR: 78 (22 Apr 2020 05:20) (77 - 94)  BP: 112/72 (22 Apr 2020 05:20) (112/72 - 135/83)  BP(mean): --  RR: 17 (22 Apr 2020 05:20) (17 - 19)  SpO2: 100% (22 Apr 2020 05:20) (100% - 100%)    PHYSICAL EXAM  General: adult in NAD  HEENT: clear oropharynx, anicteric sclera, pink conjunctiva  Neck: supple  CV: normal S1/S2 with no murmur rubs or gallops  Lungs: positive air movement b/l ant lungs,clear to auscultation, no wheezes, no rales  Abdomen: soft non-tender non-distended, no hepatosplenomegaly  Ext: no clubbing cyanosis or edema  Skin: no rashes and no petechiae  Neuro: alert and oriented X 4, no focal deficits  LABS:                          9.5    8.54  )-----------( 344      ( 22 Apr 2020 05:09 )             30.9         Mean Cell Volume : 75.0 fl  Mean Cell Hemoglobin : 23.1 pg  Mean Cell Hemoglobin Concentration : 30.7 gm/dL  Auto Neutrophil # : x  Auto Lymphocyte # : x  Auto Monocyte # : x  Auto Eosinophil # : x  Auto Basophil # : x  Auto Neutrophil % : x  Auto Lymphocyte % : x  Auto Monocyte % : x  Auto Eosinophil % : x  Auto Basophil % : x    Serial CBC  Hematocrit 30.9  Hemoglobin 9.5  Plat 344  RBC 4.12  WBC 8.54  Serial CBC  Hematocrit 30.3  Hemoglobin 9.4  Plat 334  RBC 4.11  WBC 8.45  Serial CBC  Hematocrit 33.5  Hemoglobin 10.1  Plat 355  RBC 4.50  WBC 8.52  Serial CBC  Hematocrit 36.7  Hemoglobin 11.4  Plat 325  RBC 4.91  WBC 8.44    04-22    139  |  102  |  11  ----------------------------<  82  4.0   |  27  |  0.68    Ca    8.8      22 Apr 2020 05:09            Folate, Serum: 14.0 ng/mL (04-18 @ 10:11)  Vitamin B12, Serum: 1281 pg/mL (04-18 @ 10:11)  Ferritin, Serum: 2208 ng/mL (04-18 @ 10:11)  Iron - Total Binding Capacity.: 311 ug/dL (04-18 @ 06:38)            BLOOD SMEAR INTERPRETATION:       RADIOLOGY & ADDITIONAL STUDIES:

## 2020-04-23 LAB
ANION GAP SERPL CALC-SCNC: 10 MMOL/L — SIGNIFICANT CHANGE UP (ref 5–17)
BASOPHILS # BLD AUTO: 0.07 K/UL — SIGNIFICANT CHANGE UP (ref 0–0.2)
BASOPHILS NFR BLD AUTO: 0.8 % — SIGNIFICANT CHANGE UP (ref 0–2)
BUN SERPL-MCNC: 12 MG/DL — SIGNIFICANT CHANGE UP (ref 7–18)
CALCIUM SERPL-MCNC: 9 MG/DL — SIGNIFICANT CHANGE UP (ref 8.4–10.5)
CHLORIDE SERPL-SCNC: 101 MMOL/L — SIGNIFICANT CHANGE UP (ref 96–108)
CO2 SERPL-SCNC: 25 MMOL/L — SIGNIFICANT CHANGE UP (ref 22–31)
CREAT SERPL-MCNC: 0.6 MG/DL — SIGNIFICANT CHANGE UP (ref 0.5–1.3)
EOSINOPHIL # BLD AUTO: 0.29 K/UL — SIGNIFICANT CHANGE UP (ref 0–0.5)
EOSINOPHIL NFR BLD AUTO: 3.5 % — SIGNIFICANT CHANGE UP (ref 0–6)
GLUCOSE SERPL-MCNC: 61 MG/DL — LOW (ref 70–99)
HCT VFR BLD CALC: 30 % — LOW (ref 34.5–45)
HGB BLD-MCNC: 9.2 G/DL — LOW (ref 11.5–15.5)
IMM GRANULOCYTES NFR BLD AUTO: 0.2 % — SIGNIFICANT CHANGE UP (ref 0–1.5)
LIDOCAIN IGE QN: 6182 U/L — HIGH (ref 73–393)
LYMPHOCYTES # BLD AUTO: 1.77 K/UL — SIGNIFICANT CHANGE UP (ref 1–3.3)
LYMPHOCYTES # BLD AUTO: 21.5 % — SIGNIFICANT CHANGE UP (ref 13–44)
MCHC RBC-ENTMCNC: 23.2 PG — LOW (ref 27–34)
MCHC RBC-ENTMCNC: 30.7 GM/DL — LOW (ref 32–36)
MCV RBC AUTO: 75.6 FL — LOW (ref 80–100)
MONOCYTES # BLD AUTO: 0.86 K/UL — SIGNIFICANT CHANGE UP (ref 0–0.9)
MONOCYTES NFR BLD AUTO: 10.4 % — SIGNIFICANT CHANGE UP (ref 2–14)
NEUTROPHILS # BLD AUTO: 5.23 K/UL — SIGNIFICANT CHANGE UP (ref 1.8–7.4)
NEUTROPHILS NFR BLD AUTO: 63.6 % — SIGNIFICANT CHANGE UP (ref 43–77)
NRBC # BLD: 0 /100 WBCS — SIGNIFICANT CHANGE UP (ref 0–0)
PLATELET # BLD AUTO: 329 K/UL — SIGNIFICANT CHANGE UP (ref 150–400)
POTASSIUM SERPL-MCNC: 3.9 MMOL/L — SIGNIFICANT CHANGE UP (ref 3.5–5.3)
POTASSIUM SERPL-SCNC: 3.9 MMOL/L — SIGNIFICANT CHANGE UP (ref 3.5–5.3)
RBC # BLD: 3.97 M/UL — SIGNIFICANT CHANGE UP (ref 3.8–5.2)
RBC # FLD: 14.3 % — SIGNIFICANT CHANGE UP (ref 10.3–14.5)
SODIUM SERPL-SCNC: 136 MMOL/L — SIGNIFICANT CHANGE UP (ref 135–145)
WBC # BLD: 8.24 K/UL — SIGNIFICANT CHANGE UP (ref 3.8–10.5)
WBC # FLD AUTO: 8.24 K/UL — SIGNIFICANT CHANGE UP (ref 3.8–10.5)

## 2020-04-23 RX ORDER — SODIUM CHLORIDE 9 MG/ML
1000 INJECTION, SOLUTION INTRAVENOUS
Refills: 0 | Status: DISCONTINUED | OUTPATIENT
Start: 2020-04-23 | End: 2020-04-24

## 2020-04-23 RX ADMIN — Medication 5 MILLIGRAM(S): at 22:03

## 2020-04-23 RX ADMIN — OXYCODONE AND ACETAMINOPHEN 1 TABLET(S): 5; 325 TABLET ORAL at 15:58

## 2020-04-23 RX ADMIN — OXYCODONE AND ACETAMINOPHEN 1 TABLET(S): 5; 325 TABLET ORAL at 00:51

## 2020-04-23 RX ADMIN — SODIUM CHLORIDE 80 MILLILITER(S): 9 INJECTION, SOLUTION INTRAVENOUS at 11:38

## 2020-04-23 RX ADMIN — SIMVASTATIN 10 MILLIGRAM(S): 20 TABLET, FILM COATED ORAL at 22:03

## 2020-04-23 RX ADMIN — OXYCODONE AND ACETAMINOPHEN 1 TABLET(S): 5; 325 TABLET ORAL at 22:03

## 2020-04-23 NOTE — PROGRESS NOTE ADULT - PROBLEM SELECTOR PLAN 3
> On therapeutic Lovenox  > Will transition to NOAC before discharge   > Lovenox held today (4/23/2020) for IR procedure on 4/24/2020

## 2020-04-23 NOTE — CHART NOTE - NSCHARTNOTEFT_GEN_A_CORE
Reassessment:   74yFemalePatient is a 74y old  Female who presents with a chief complaint of Abdominal pain (2020 12:07)      Factors impacting intake: [ ] none [ ] nausea  [ ] vomiting [ ] diarrhea [ ] constipation  [ ]chewing problems [ ] swallowing issues  [ X] other: Malignant neoplasm of pancrease, pancreatitis    Diet Presciption: Diet, NPO:   Except Medications  With Ice Chips/Sips of Water (20 @ 09:46)    Intake: Visited pt. alert but in pain, presently NPO with IV fluids due pancreatitis, awaiting biopsy & followed by IR, Oncologist consult noted, rec. advance diet with nutrition supplement or may consider alternate nutrition support if NPO prolonged as medically feasible. RD available.     Daily Weight in k (2020 04:53)  Weight in k.3 (2020 04:47)  Weight in k.9 (2020 12:02)  Weight in k.8 (2020 04:45)  Weight in k.8 (2020 04:30)    % Weight Change: wt. variation noted may be due to scale variance?    Pertinent Medications: MEDICATIONS  (STANDING):  dextrose 5% + sodium chloride 0.45%. 1000 milliLiter(s) (80 mL/Hr) IV Continuous <Continuous>  enoxaparin Injectable 50 milliGRAM(s) SubCutaneous every 12 hours  melatonin 5 milliGRAM(s) Oral at bedtime  simvastatin 10 milliGRAM(s) Oral at bedtime    MEDICATIONS  (PRN):  acetaminophen   Tablet .. 650 milliGRAM(s) Oral every 6 hours PRN Temp greater or equal to 38C (100.4F), Mild Pain (1 - 3)  ondansetron   Disintegrating Tablet 4 milliGRAM(s) Oral every 8 hours PRN Nausea and/or Vomiting  oxycodone    5 mG/acetaminophen 325 mG 1 Tablet(s) Oral every 6 hours PRN Severe Pain (7 - 10)    Pertinent Labs:  Na136 mmol/L Glu 61 mg/dL<L> K+ 3.9 mmol/L Cr  0.60 mg/dL BUN 12 mg/dL  Phos 3.9 mg/dL  Alb 3.3 g/dL<L>  Chol 194 mg/dL  mg/dL HDL 32 mg/dL<L> Trig 123 mg/dL     CAPILLARY BLOOD GLUCOSE    Skin: intact    Estimated Needs:   [X ] no change since previous assessment  [ ] recalculated:     Previous Nutrition Diagnosis:   [ ] Inadequate Energy Intake [ ]Inadequate Oral Intake [ ] Excessive Energy Intake   [ ] Underweight [ ] Increased Nutrient Needs [ ] Overweight/Obesity   [ ] Altered GI Function [ ] Unintended Weight Loss [ ] Food & Nutrition Related Knowledge Deficit [Moderate] Malnutrition     Nutrition Diagnosis is [X ] ongoing  [ ] resolved [ ] not applicable     New Nutrition Diagnosis: [ ] not applicable     Interventions: To meet nutrition needs   Recommend  [ ] Change Diet To:  [ ] Nutrition Supplement  [ ] Nutrition Support  [ ] Other:     Monitoring and Evaluation:   [X ] PO intake [ x ] Tolerance to diet prescription [ x ] weights [ x ] labs[ x ] follow up per protocol  [ ] other:

## 2020-04-23 NOTE — PROGRESS NOTE ADULT - SUBJECTIVE AND OBJECTIVE BOX
Pt Name: KEEGAN WATSON  MRN: [id number]      74yFemaleHPI:  Patient seen and evaluated at bedside. Patient states abdominal pain is improving.  Patient rates the abdominal pain as "4/10" in the epigastric region.         PHYSICAL EXAM:    Vital Signs Last 24 Hrs  T(C): 36.7 (23 Apr 2020 05:45), Max: 36.7 (23 Apr 2020 05:45)  T(F): 98 (23 Apr 2020 05:45), Max: 98 (23 Apr 2020 05:45)  HR: 84 (23 Apr 2020 05:45) (77 - 84)  BP: 124/73 (23 Apr 2020 05:45) (115/73 - 126/76)  BP(mean): --  RR: 17 (23 Apr 2020 05:45) (16 - 17)  SpO2: 100% (23 Apr 2020 05:45) (99% - 100%)    General: adult in NAD  HEENT: clear oropharynx, anicteric sclera, pink conjunctiva  Neck: supple  CV: normal S1/S2 with no murmur rubs or gallops  Abdomen: soft non-tender non-distended, no hepatosplenomegaly  Ext: no clubbing cyanosis or edema  Skin: no rashes and no petechiae  Neuro: alert and oriented X 4, no focal deficits    LABS:                        9.2    8.24  )-----------( 329      ( 23 Apr 2020 06:07 )             30.0     04-23    136  |  101  |  12  ----------------------------<  61<L>  3.9   |  25  |  0.60    Ca    9.0      23 Apr 2020 06:07 Pt Name: KEEGAN WATSON  MRN: [id number]      74 year old female:    HPI:  Patient seen and evaluated at bedside. Patient states abdominal pain is improving.  Patient rates the abdominal pain as "4/10" in the epigastric region.     REVIEW OF SYSTEMS:  CONSTITUTIONAL: No fever, weight loss, has fatigue  EYES: No eye pain, visual disturbances, or discharge  ENMT:  No difficulty hearing, tinnitus, vertigo; No sinus or throat pain  NECK: No pain or stiffness  RESPIRATORY: No cough, wheezing, chills or hemoptysis; No shortness of breath  CARDIOVASCULAR: No chest pain, palpitations, dizziness, or leg swelling  GASTROINTESTINAL: No abdominal or epigastric pain. No nausea, vomiting, or hematemesis; No diarrhea or constipation. No melena or hematochezia.  GENITOURINARY: No dysuria, frequency, hematuria, or incontinence  NEUROLOGICAL: No headaches, memory loss, loss of strength, numbness, or tremors  SKIN: No itching, burning, rashes, or lesions   ENDOCRINE: No heat or cold intolerance; No hair loss  MUSCULOSKELETAL: No joint pain or swelling; No muscle, back, or extremity pain  PSYCHIATRIC: No depression, anxiety, mood swings, or difficulty sleeping  HEME/LYMPH: No easy bruising, or bleeding gums  ALLERGY AND IMMUNOLOGIC: No hives or eczema        PHYSICAL EXAM:    Vital Signs Last 24 Hrs  T(C): 36.7 (23 Apr 2020 05:45), Max: 36.7 (23 Apr 2020 05:45)  T(F): 98 (23 Apr 2020 05:45), Max: 98 (23 Apr 2020 05:45)  HR: 84 (23 Apr 2020 05:45) (77 - 84)  BP: 124/73 (23 Apr 2020 05:45) (115/73 - 126/76)  RR: 17 (23 Apr 2020 05:45) (16 - 17)  SpO2: 100% (23 Apr 2020 05:45) (99% - 100%)    General: adult in NAD  HEENT: clear oropharynx, anicteric sclera, pink conjunctiva  Neck: supple  CV: normal S1/S2 with no murmur rubs or gallops  Abdomen: soft non-tender non-distended, no hepatosplenomegaly  Ext: no clubbing cyanosis or edema  Skin: no rashes and no petechiae  Neuro: alert and oriented X 4, no focal deficits  PSYCH: calm    LABS:                        9.2    8.24  )-----------( 329      ( 23 Apr 2020 06:07 )             30.0     04-23    136  |  101  |  12  ----------------------------<  61<L>  3.9   |  25  |  0.60    Ca    9.0      23 Apr 2020 06:07

## 2020-04-23 NOTE — PROGRESS NOTE ADULT - PROBLEM SELECTOR PLAN 1
> Hold lovenox on 4/23/2020 for Liver bx on 4/24/2020  biopsy scheduled for Friday 4/24/2020, d/w IR  will continue NPO for pancreatitis    > D5 with 0.45% NaCl @80cc/hr ordered while patient NPO > Hold lovenox on 4/23/2020 for Liver biopsy on 4/24/2020  biopsy scheduled for Friday 4/24/2020, d/w IR  will continue NPO for pancreatitis    > D5 with 0.45% NaCl @80cc/hr ordered while patient NPO

## 2020-04-23 NOTE — PROGRESS NOTE ADULT - ATTENDING COMMENTS
> HTN (hypertension) - BP stable   > Anemia - H/H stable  > Moderate protein-calorie malnutrition.       Patient seen and examined. Patient's history, vitals, labs, imaging studies reviewed. Discussed with above PA, agree with note with edits. Plan is for liver biopsy tomorrow. Plan of care discussed with patient, and agrees, all questions answered.   Jessy Parkinson MD  4/23/2020

## 2020-04-24 ENCOUNTER — RESULT REVIEW (OUTPATIENT)
Age: 75
End: 2020-04-24

## 2020-04-24 LAB
ANION GAP SERPL CALC-SCNC: 10 MMOL/L — SIGNIFICANT CHANGE UP (ref 5–17)
APTT BLD: 24.5 SEC — LOW (ref 27.5–36.3)
BUN SERPL-MCNC: 11 MG/DL — SIGNIFICANT CHANGE UP (ref 7–18)
CALCIUM SERPL-MCNC: 9.3 MG/DL — SIGNIFICANT CHANGE UP (ref 8.4–10.5)
CHLORIDE SERPL-SCNC: 101 MMOL/L — SIGNIFICANT CHANGE UP (ref 96–108)
CO2 SERPL-SCNC: 26 MMOL/L — SIGNIFICANT CHANGE UP (ref 22–31)
CREAT SERPL-MCNC: 0.97 MG/DL — SIGNIFICANT CHANGE UP (ref 0.5–1.3)
GLUCOSE SERPL-MCNC: 150 MG/DL — HIGH (ref 70–99)
HCT VFR BLD CALC: 32.2 % — LOW (ref 34.5–45)
HGB BLD-MCNC: 9.9 G/DL — LOW (ref 11.5–15.5)
INR BLD: 1.2 RATIO — HIGH (ref 0.88–1.16)
MCHC RBC-ENTMCNC: 23.2 PG — LOW (ref 27–34)
MCHC RBC-ENTMCNC: 30.7 GM/DL — LOW (ref 32–36)
MCV RBC AUTO: 75.4 FL — LOW (ref 80–100)
NRBC # BLD: 0 /100 WBCS — SIGNIFICANT CHANGE UP (ref 0–0)
PLATELET # BLD AUTO: 358 K/UL — SIGNIFICANT CHANGE UP (ref 150–400)
POTASSIUM SERPL-MCNC: 3.3 MMOL/L — LOW (ref 3.5–5.3)
POTASSIUM SERPL-SCNC: 3.3 MMOL/L — LOW (ref 3.5–5.3)
PROTHROM AB SERPL-ACNC: 13.6 SEC — HIGH (ref 10–12.9)
RBC # BLD: 4.27 M/UL — SIGNIFICANT CHANGE UP (ref 3.8–5.2)
RBC # FLD: 14.6 % — HIGH (ref 10.3–14.5)
SODIUM SERPL-SCNC: 137 MMOL/L — SIGNIFICANT CHANGE UP (ref 135–145)
WBC # BLD: 7.56 K/UL — SIGNIFICANT CHANGE UP (ref 3.8–10.5)
WBC # FLD AUTO: 7.56 K/UL — SIGNIFICANT CHANGE UP (ref 3.8–10.5)

## 2020-04-24 PROCEDURE — 47000 NEEDLE BIOPSY OF LIVER PERQ: CPT

## 2020-04-24 PROCEDURE — 88307 TISSUE EXAM BY PATHOLOGIST: CPT | Mod: 26

## 2020-04-24 PROCEDURE — 76942 ECHO GUIDE FOR BIOPSY: CPT | Mod: 26

## 2020-04-24 RX ORDER — OXYCODONE AND ACETAMINOPHEN 5; 325 MG/1; MG/1
1 TABLET ORAL ONCE
Refills: 0 | Status: DISCONTINUED | OUTPATIENT
Start: 2020-04-24 | End: 2020-04-24

## 2020-04-24 RX ORDER — POTASSIUM CHLORIDE 20 MEQ
10 PACKET (EA) ORAL ONCE
Refills: 0 | Status: COMPLETED | OUTPATIENT
Start: 2020-04-24 | End: 2020-04-24

## 2020-04-24 RX ADMIN — Medication 650 MILLIGRAM(S): at 13:45

## 2020-04-24 RX ADMIN — Medication 100 MILLIEQUIVALENT(S): at 15:24

## 2020-04-24 RX ADMIN — SIMVASTATIN 10 MILLIGRAM(S): 20 TABLET, FILM COATED ORAL at 21:49

## 2020-04-24 RX ADMIN — Medication 5 MILLIGRAM(S): at 21:49

## 2020-04-24 RX ADMIN — OXYCODONE AND ACETAMINOPHEN 1 TABLET(S): 5; 325 TABLET ORAL at 20:55

## 2020-04-24 NOTE — CHART NOTE - NSCHARTNOTEFT_GEN_A_CORE
Vascular & Interventional Radiology Post-Procedure Note    Pre-Procedure Diagnosis: Liver masses  Post-Procedure Diagnosis: Same as pre.  Indications for Procedure: malignancy workup    : Naif  Assistant(s): earnest    Procedure Details/Findings: segment 6 mass needle biopsy  Access (if applicable): subcostal, thru normal liver parenchyma into mass    Complications: none  Estimated Blood Loss: Minimal  Specimen: 4 cores 18G by 13mm each  Contrast: none  Sedation: Anesthesia  Patient Condition/Disposition: 2 hour recovery then floor if stable.    Plan: f/u path Vascular & Interventional Radiology Post-Procedure Note    Pre-Procedure Diagnosis: Liver masses  Post-Procedure Diagnosis: Same as pre.  Indications for Procedure: malignancy workup    : Naif  Assistant(s): earnest    Procedure Details/Findings: segment 6 mass needle biopsy  Access (if applicable): subcostal, thru normal liver parenchyma into mass    Complications: none  Estimated Blood Loss: Minimal  Specimen: 4 cores 18G by 13mm each  Contrast: none  Sedation: Anesthesia  Patient Condition/Disposition: 2 hour recovery then floor if stable.    Plan: f/u path  Anticoagulation: NO anticoagulation for 48 hours

## 2020-04-24 NOTE — PROGRESS NOTE ADULT - PROBLEM SELECTOR PLAN 5
> Patient on therapeutic lovenox      - lovenox held yesterday and today for Liver biopsy  > Per Dr. Haynes, may resume lovenox 6 hours post biopsy, patient to be transitioned to NOAC before discharge

## 2020-04-24 NOTE — PROGRESS NOTE ADULT - SUBJECTIVE AND OBJECTIVE BOX
pain is under control  no bleeding or sob    MEDICATIONS  (STANDING):  dextrose 5% + sodium chloride 0.45%. 1000 milliLiter(s) (80 mL/Hr) IV Continuous <Continuous>  enoxaparin Injectable 50 milliGRAM(s) SubCutaneous every 12 hours  melatonin 5 milliGRAM(s) Oral at bedtime  simvastatin 10 milliGRAM(s) Oral at bedtime    MEDICATIONS  (PRN):  acetaminophen   Tablet .. 650 milliGRAM(s) Oral every 6 hours PRN Temp greater or equal to 38C (100.4F), Mild Pain (1 - 3)  ondansetron   Disintegrating Tablet 4 milliGRAM(s) Oral every 8 hours PRN Nausea and/or Vomiting  oxycodone    5 mG/acetaminophen 325 mG 1 Tablet(s) Oral every 6 hours PRN Severe Pain (7 - 10)      Allergies    No Known Allergies    Intolerances        Vital Signs Last 24 Hrs  T(C): 36.5 (24 Apr 2020 14:55), Max: 36.7 (23 Apr 2020 20:27)  T(F): 97.7 (24 Apr 2020 14:55), Max: 98.1 (23 Apr 2020 20:27)  HR: 88 (24 Apr 2020 14:55) (75 - 88)  BP: 125/84 (24 Apr 2020 14:55) (101/68 - 127/68)  BP(mean): --  RR: 16 (24 Apr 2020 14:55) (16 - 17)  SpO2: 100% (24 Apr 2020 14:55) (100% - 100%)    PHYSICAL EXAM  General: adult in NAD  HEENT: clear oropharynx, anicteric sclera, pink conjunctiva  Neck: supple  CV: normal S1/S2 with no murmur rubs or gallops  Lungs: positive air movement b/l ant lungs,clear to auscultation, no wheezes, no rales  Abdomen: soft non-tender non-distended, no hepatosplenomegaly  Ext: no clubbing cyanosis or edema  Skin: no rashes and no petechiae  Neuro: alert and oriented X 4, no focal deficits  LABS:                          9.9    7.56  )-----------( 358      ( 24 Apr 2020 06:28 )             32.2         Mean Cell Volume : 75.4 fl  Mean Cell Hemoglobin : 23.2 pg  Mean Cell Hemoglobin Concentration : 30.7 gm/dL  Auto Neutrophil # : x  Auto Lymphocyte # : x  Auto Monocyte # : x  Auto Eosinophil # : x  Auto Basophil # : x  Auto Neutrophil % : x  Auto Lymphocyte % : x  Auto Monocyte % : x  Auto Eosinophil % : x  Auto Basophil % : x    Serial CBC  Hematocrit 32.2  Hemoglobin 9.9  Plat 358  RBC 4.27  WBC 7.56  Serial CBC  Hematocrit 30.0  Hemoglobin 9.2  Plat 329  RBC 3.97  WBC 8.24  Serial CBC  Hematocrit 30.9  Hemoglobin 9.5  Plat 344  RBC 4.12  WBC 8.54  Serial CBC  Hematocrit 30.3  Hemoglobin 9.4  Plat 334  RBC 4.11  WBC 8.45    04-24    137  |  101  |  11  ----------------------------<  150<H>  3.3<L>   |  26  |  0.97    Ca    9.3      24 Apr 2020 06:28        PT/INR - ( 24 Apr 2020 09:13 )   PT: 13.6 sec;   INR: 1.20 ratio         PTT - ( 24 Apr 2020 09:13 )  PTT:24.5 sec    Folate, Serum: 14.0 ng/mL (04-18 @ 10:11)  Vitamin B12, Serum: 1281 pg/mL (04-18 @ 10:11)  Ferritin, Serum: 2208 ng/mL (04-18 @ 10:11)  Iron - Total Binding Capacity.: 311 ug/dL (04-18 @ 06:38)            BLOOD SMEAR INTERPRETATION:       RADIOLOGY & ADDITIONAL STUDIES:

## 2020-04-24 NOTE — PROGRESS NOTE ADULT - PROBLEM SELECTOR PLAN 1
> Anticoagulation held today for Liver biopsy  > Patient pending Liver biopsy today by IR  > Keep patient NPO  > Per Dr. Haynes, may resume lovenox 6 hours post biopsy, patient to be transitioned to NOAC before discharge

## 2020-04-24 NOTE — PROGRESS NOTE ADULT - PROVIDER SPECIALTY LIST ADULT
Heme/Onc
Internal Medicine

## 2020-04-24 NOTE — PROGRESS NOTE ADULT - PROBLEM SELECTOR PROBLEM 3
HTN (hypertension) [No Acute Distress] : no acute distress [Normal Sclera/Conjunctiva] : normal sclera/conjunctiva [PERRL] : pupils equal round and reactive to light [EOMI] : extraocular movements intact [Normal Oropharynx] : the oropharynx was normal [Normal TMs] : both tympanic membranes were normal [No JVD] : no jugular venous distention [Supple] : supple [No Lymphadenopathy] : no lymphadenopathy [Clear to Auscultation] : lungs were clear to auscultation bilaterally [No Accessory Muscle Use] : no accessory muscle use [Regular Rhythm] : with a regular rhythm [Normal S1, S2] : normal S1 and S2 [Soft] : abdomen soft [Non Tender] : non-tender [Non-distended] : non-distended [No CVA Tenderness] : no CVA  tenderness [No Spinal Tenderness] : no spinal tenderness [No Joint Swelling] : no joint swelling [Grossly Normal Strength/Tone] : grossly normal strength/tone [de-identified] : AA Ox3 in NAD [de-identified] : pain trapezious muscle area back and occipital area no rashes or welling normal ROM little pain with motinon no radicular pain reproduced with exxam cervical spine

## 2020-04-24 NOTE — PROGRESS NOTE ADULT - ASSESSMENT
· Assessment		  74 year old female presented with abdominal pain and chest pain for 12 days with weight loss.  CT showed pancreatic mass, liver mets and PE.    Problem/Recommendation - 1:  Problem: Malignant neoplasm of pancreas, unspecified location of malignancy. Recommendation: with liver mets  will f/u on Ca 19.9 and CEA, ca 19.9 is 10,000  the abdominal pain can be from liver mets or pancreatitis due to obstructed pancreatic duct  discuss liver biopsy with her and she agreed  discussed with IR  will dc asa  biopsy done today  abd pain is less since NPO  will continue NPO for pancreatitis    Problem/Recommendation - 2:  ·  Problem: Pulmonary embolism.  Recommendation: can start lovenox  IR wants AC to be held for 48 hour.  She had recent PE, I can not hold AC for that long  will repeat CBC tonight lori tomorrow am  if cbc stable and she is stable without new pain, I will begin AC tomorraw am

## 2020-04-24 NOTE — PROGRESS NOTE ADULT - SUBJECTIVE AND OBJECTIVE BOX
Pt Name: KEEGAN WATSON  MRN: [id number]      74yFemaleHPI:  Patient seen and evaluated at bedside. Patient with no acute complaints.  Patient is undergoing liver biopsy today by IR.  Patient reports abdominal pain is minimal today.  Denies any N/V/D, chest pain, SOB      PHYSICAL EXAM:    Vital Signs Last 24 Hrs  T(C): 36.3 (24 Apr 2020 05:45), Max: 36.7 (23 Apr 2020 20:27)  T(F): 97.4 (24 Apr 2020 05:45), Max: 98.1 (23 Apr 2020 20:27)  HR: 75 (24 Apr 2020 05:45) (75 - 90)  BP: 101/68 (24 Apr 2020 05:45) (101/68 - 143/88)  BP(mean): --  RR: 17 (24 Apr 2020 05:45) (16 - 17)  SpO2: 100% (24 Apr 2020 05:45) (100% - 100%)    General: adult in NAD  HEENT: clear oropharynx, anicteric sclera, pink conjunctiva  Neck: supple  CV: normal S1/S2 with no murmur rubs or gallops  Abdomen: soft non-tender non-distended, no hepatosplenomegaly  Ext: no clubbing cyanosis or edema  Skin: no rashes and no petechiae  Neuro: alert and oriented X 4, no focal deficits        LABS:                        9.9    7.56  )-----------( 358      ( 24 Apr 2020 06:28 )             32.2     04-24    137  |  101  |  11  ----------------------------<  150<H>  3.3<L>   |  26  |  0.97    Ca    9.3      24 Apr 2020 06:28      PT/INR - ( 24 Apr 2020 09:13 )   PT: 13.6 sec;   INR: 1.20 ratio         PTT - ( 24 Apr 2020 09:13 )  PTT:24.5 sec Pt Name: KEEGAN WATSON  MRN: [id number]      74yFemaleHPI:  Patient seen and evaluated at bedside. Patient with no acute complaints.  Patient is undergoing liver biopsy today by IR.  Patient reports abdominal pain is minimal today.  Denies any N/V/D, chest pain, SOB    REVIEW OF SYSTEMS:  CONSTITUTIONAL: No fever, weight loss, has fatigue  EYES: No eye pain, visual disturbances, or discharge  ENMT:  No difficulty hearing, tinnitus, vertigo; No sinus or throat pain  NECK: No pain or stiffness  RESPIRATORY: No cough, wheezing, chills or hemoptysis; No shortness of breath  CARDIOVASCULAR: No chest pain, palpitations, dizziness, or leg swelling  GASTROINTESTINAL: No abdominal or epigastric pain. No nausea, vomiting, or hematemesis; No diarrhea or constipation. No melena or hematochezia.  GENITOURINARY: No dysuria, frequency, hematuria, or incontinence  NEUROLOGICAL: No headaches, memory loss, loss of strength, numbness, or tremors  SKIN: No itching, burning, rashes, or lesions   ENDOCRINE: No heat or cold intolerance; No hair loss  MUSCULOSKELETAL: No joint pain or swelling; No muscle, back, or extremity pain  PSYCHIATRIC: No depression, anxiety, mood swings, or difficulty sleeping  HEME/LYMPH: No easy bruising, or bleeding gums  ALLERGY AND IMMUNOLOGIC: No hives or eczema      PHYSICAL EXAM:    Vital Signs Last 24 Hrs  T(C): 36.3 (24 Apr 2020 05:45), Max: 36.7 (23 Apr 2020 20:27)  T(F): 97.4 (24 Apr 2020 05:45), Max: 98.1 (23 Apr 2020 20:27)  HR: 75 (24 Apr 2020 05:45) (75 - 90)  BP: 101/68 (24 Apr 2020 05:45) (101/68 - 143/88)  RR: 17 (24 Apr 2020 05:45) (16 - 17)  SpO2: 100% (24 Apr 2020 05:45) (100% - 100%)    General: adult in NAD  HEENT: clear oropharynx, anicteric sclera, pink conjunctiva  Neck: supple  CV: normal S1/S2 with no murmur rubs or gallops  Lungs: CTA, no r/r/w  Abdomen: soft non-tender non-distended, no hepatosplenomegaly  Ext: no clubbing cyanosis or edema  Skin: no rashes and no petechiae  Neuro: alert and oriented X 4, no focal deficits  PSYCH: calm        LABS:                        9.9    7.56  )-----------( 358      ( 24 Apr 2020 06:28 )             32.2     04-24    137  |  101  |  11  ----------------------------<  150<H>  3.3<L>   |  26  |  0.97    Ca    9.3      24 Apr 2020 06:28      PT/INR - ( 24 Apr 2020 09:13 )   PT: 13.6 sec;   INR: 1.20 ratio         PTT - ( 24 Apr 2020 09:13 )  PTT:24.5 sec

## 2020-04-24 NOTE — CHART NOTE - NSCHARTNOTEFT_GEN_A_CORE
Vascular & Interventional Radiology Pre-Procedure Note    Procedure Name: Liver needle biopsy    HPI: 74y Female with numerous liver masses    Allergies:   Medications (Abx/Cardiac/Anticoagulation/Blood Products)    enoxaparin Injectable: 50 milliGRAM(s) SubCutaneous (04-22 @ 17:27)    Data:    T(C): 36.3  HR: 75  BP: 101/68  RR: 17  SpO2: 100%    Exam  General: NAD  Chest: non labored    -WBC 7.56 / HgB 9.9 / Hct 32.2 / Plt 358  -Na 137 / Cl 101 / BUN 11 / Glucose 150  -K 3.3 / CO2 26 / Cr 0.97  -ALT -- / Alk Phos -- / T.Bili --  -INR1.20    Imaging: multiple liver masses    Plan:   -74y Female presents for liver mass needle bioppsy  -Anticoagulation held  -Risks/Benefits/alternatives explained with the patient and/or healthcare proxy and witnessed informed consent obtained.

## 2020-04-24 NOTE — PROGRESS NOTE ADULT - ATTENDING COMMENTS
Patient seen and examined. Patient's history, vitals, labs, imaging studies reviewed. Discussed with above resident, agree with note with edits. Patient is s/p liver biopsy today, tolerated well, advance diet as tolerated. Plan of care discussed with patient, and agrees, all questions answered.   Jessy Parkinson MD

## 2020-04-24 NOTE — PROGRESS NOTE ADULT - ASSESSMENT
74 year old female presented with abdominal pain and chest pain for 12 days with weight loss.  CT showed pancreatic mass, liver mets and PE.    Problem/Plan - 1:  ·  Problem: Malignant neoplasm of pancreas, unspecified location of malignancy.  Plan: > Hold lovenox on 4/23/2020 for Liver biopsy on 4/24/2020  biopsy scheduled for Friday 4/24/2020, d/w IR  will continue NPO for pancreatitis    > D5 with 0.45% NaCl @80cc/hr ordered while patient NPO.     Problem/Plan - 2:  ·  Problem: HTN (hypertension).  Plan: > Blood pressure stable   > continue to monitor vitals.      Problem/Plan - 3:  ·  Problem: Pulmonary embolism.  Plan: > On therapeutic Lovenox  > Will transition to NOAC before discharge   > Lovenox held today (4/23/2020) for IR procedure on 4/24/2020.      Problem/Plan - 4:  ·  Problem: Hyperlipidemia.  Plan: > C/w statin.     Attending Attestation:   > HTN (hypertension) - BP stable   > Anemia - H/H stable  > Moderate protein-calorie malnutrition. 74 year old female presented with abdominal pain and chest pain for 12 days with weight loss.  CT showed pancreatic mass, liver mets and PE.    Problem/Plan - 1:  ·  Problem: Malignant neoplasm of pancreas, unspecified location of malignancy.  Plan: > Hold lovenox on 4/23/2020 for Liver biopsy on 4/24/2020  biopsy scheduled for today, d/w IR  will continue NPO for pancreatitis    > D5 with 0.45% NaCl @80cc/hr ordered while patient NPO.     Problem/Plan - 2:  ·  Problem: HTN (hypertension).  Plan: > Blood pressure stable   > continue to monitor vitals.      Problem/Plan - 3:  ·  Problem: Pulmonary embolism.  Plan: > On therapeutic Lovenox  > Will transition to NOAC before discharge   > Lovenox held for IR procedure today     Problem/Plan - 4:  ·  Problem: Hyperlipidemia.  Plan: > C/w statin.       > HTN (hypertension) - BP stable   > Anemia - H/H stable  > Moderate protein-calorie malnutrition.

## 2020-04-25 ENCOUNTER — TRANSCRIPTION ENCOUNTER (OUTPATIENT)
Age: 75
End: 2020-04-25

## 2020-04-25 VITALS
HEART RATE: 98 BPM | DIASTOLIC BLOOD PRESSURE: 79 MMHG | SYSTOLIC BLOOD PRESSURE: 127 MMHG | RESPIRATION RATE: 16 BRPM | TEMPERATURE: 98 F | OXYGEN SATURATION: 100 %

## 2020-04-25 LAB
ANION GAP SERPL CALC-SCNC: 7 MMOL/L — SIGNIFICANT CHANGE UP (ref 5–17)
BUN SERPL-MCNC: 7 MG/DL — SIGNIFICANT CHANGE UP (ref 7–18)
CALCIUM SERPL-MCNC: 9 MG/DL — SIGNIFICANT CHANGE UP (ref 8.4–10.5)
CHLORIDE SERPL-SCNC: 104 MMOL/L — SIGNIFICANT CHANGE UP (ref 96–108)
CO2 SERPL-SCNC: 28 MMOL/L — SIGNIFICANT CHANGE UP (ref 22–31)
CREAT SERPL-MCNC: 0.73 MG/DL — SIGNIFICANT CHANGE UP (ref 0.5–1.3)
GLUCOSE SERPL-MCNC: 116 MG/DL — HIGH (ref 70–99)
HCT VFR BLD CALC: 30.9 % — LOW (ref 34.5–45)
HGB BLD-MCNC: 9.5 G/DL — LOW (ref 11.5–15.5)
MCHC RBC-ENTMCNC: 23.1 PG — LOW (ref 27–34)
MCHC RBC-ENTMCNC: 30.7 GM/DL — LOW (ref 32–36)
MCV RBC AUTO: 75.2 FL — LOW (ref 80–100)
NRBC # BLD: 0 /100 WBCS — SIGNIFICANT CHANGE UP (ref 0–0)
PLATELET # BLD AUTO: 288 K/UL — SIGNIFICANT CHANGE UP (ref 150–400)
POTASSIUM SERPL-MCNC: 3.4 MMOL/L — LOW (ref 3.5–5.3)
POTASSIUM SERPL-SCNC: 3.4 MMOL/L — LOW (ref 3.5–5.3)
RBC # BLD: 4.11 M/UL — SIGNIFICANT CHANGE UP (ref 3.8–5.2)
RBC # FLD: 14.5 % — SIGNIFICANT CHANGE UP (ref 10.3–14.5)
SODIUM SERPL-SCNC: 139 MMOL/L — SIGNIFICANT CHANGE UP (ref 135–145)
WBC # BLD: 8.07 K/UL — SIGNIFICANT CHANGE UP (ref 3.8–10.5)
WBC # FLD AUTO: 8.07 K/UL — SIGNIFICANT CHANGE UP (ref 3.8–10.5)

## 2020-04-25 PROCEDURE — 71046 X-RAY EXAM CHEST 2 VIEWS: CPT

## 2020-04-25 PROCEDURE — 86803 HEPATITIS C AB TEST: CPT

## 2020-04-25 PROCEDURE — 85652 RBC SED RATE AUTOMATED: CPT

## 2020-04-25 PROCEDURE — 87635 SARS-COV-2 COVID-19 AMP PRB: CPT

## 2020-04-25 PROCEDURE — 74177 CT ABD & PELVIS W/CONTRAST: CPT

## 2020-04-25 PROCEDURE — 82378 CARCINOEMBRYONIC ANTIGEN: CPT

## 2020-04-25 PROCEDURE — 71275 CT ANGIOGRAPHY CHEST: CPT

## 2020-04-25 PROCEDURE — 83690 ASSAY OF LIPASE: CPT

## 2020-04-25 PROCEDURE — 83550 IRON BINDING TEST: CPT

## 2020-04-25 PROCEDURE — 81001 URINALYSIS AUTO W/SCOPE: CPT

## 2020-04-25 PROCEDURE — 86140 C-REACTIVE PROTEIN: CPT

## 2020-04-25 PROCEDURE — 47000 NEEDLE BIOPSY OF LIVER PERQ: CPT

## 2020-04-25 PROCEDURE — 83036 HEMOGLOBIN GLYCOSYLATED A1C: CPT

## 2020-04-25 PROCEDURE — 84466 ASSAY OF TRANSFERRIN: CPT

## 2020-04-25 PROCEDURE — 84478 ASSAY OF TRIGLYCERIDES: CPT

## 2020-04-25 PROCEDURE — 84100 ASSAY OF PHOSPHORUS: CPT

## 2020-04-25 PROCEDURE — 85610 PROTHROMBIN TIME: CPT

## 2020-04-25 PROCEDURE — 88307 TISSUE EXAM BY PATHOLOGIST: CPT

## 2020-04-25 PROCEDURE — 36415 COLL VENOUS BLD VENIPUNCTURE: CPT

## 2020-04-25 PROCEDURE — 82306 VITAMIN D 25 HYDROXY: CPT

## 2020-04-25 PROCEDURE — 83540 ASSAY OF IRON: CPT

## 2020-04-25 PROCEDURE — 84443 ASSAY THYROID STIM HORMONE: CPT

## 2020-04-25 PROCEDURE — 84145 PROCALCITONIN (PCT): CPT

## 2020-04-25 PROCEDURE — 99285 EMERGENCY DEPT VISIT HI MDM: CPT | Mod: 25

## 2020-04-25 PROCEDURE — 80061 LIPID PANEL: CPT

## 2020-04-25 PROCEDURE — 93971 EXTREMITY STUDY: CPT

## 2020-04-25 PROCEDURE — 76942 ECHO GUIDE FOR BIOPSY: CPT

## 2020-04-25 PROCEDURE — 87040 BLOOD CULTURE FOR BACTERIA: CPT

## 2020-04-25 PROCEDURE — 93306 TTE W/DOPPLER COMPLETE: CPT

## 2020-04-25 PROCEDURE — 80053 COMPREHEN METABOLIC PANEL: CPT

## 2020-04-25 PROCEDURE — 82550 ASSAY OF CK (CPK): CPT

## 2020-04-25 PROCEDURE — 86301 IMMUNOASSAY TUMOR CA 19-9: CPT

## 2020-04-25 PROCEDURE — 80048 BASIC METABOLIC PNL TOTAL CA: CPT

## 2020-04-25 PROCEDURE — 84484 ASSAY OF TROPONIN QUANT: CPT

## 2020-04-25 PROCEDURE — 85379 FIBRIN DEGRADATION QUANT: CPT

## 2020-04-25 PROCEDURE — 83735 ASSAY OF MAGNESIUM: CPT

## 2020-04-25 PROCEDURE — 85730 THROMBOPLASTIN TIME PARTIAL: CPT

## 2020-04-25 PROCEDURE — 83615 LACTATE (LD) (LDH) ENZYME: CPT

## 2020-04-25 PROCEDURE — 82746 ASSAY OF FOLIC ACID SERUM: CPT

## 2020-04-25 PROCEDURE — 82728 ASSAY OF FERRITIN: CPT

## 2020-04-25 PROCEDURE — 93005 ELECTROCARDIOGRAM TRACING: CPT

## 2020-04-25 PROCEDURE — 82607 VITAMIN B-12: CPT

## 2020-04-25 PROCEDURE — 93970 EXTREMITY STUDY: CPT

## 2020-04-25 PROCEDURE — 85027 COMPLETE CBC AUTOMATED: CPT

## 2020-04-25 PROCEDURE — 82105 ALPHA-FETOPROTEIN SERUM: CPT

## 2020-04-25 RX ORDER — APIXABAN 2.5 MG/1
2 TABLET, FILM COATED ORAL
Qty: 82 | Refills: 0
Start: 2020-04-25 | End: 2020-04-30

## 2020-04-25 RX ORDER — ACETAMINOPHEN 500 MG
1 TABLET ORAL
Qty: 120 | Refills: 0
Start: 2020-04-25 | End: 2020-05-24

## 2020-04-25 RX ORDER — POTASSIUM CHLORIDE 20 MEQ
20 PACKET (EA) ORAL ONCE
Refills: 0 | Status: COMPLETED | OUTPATIENT
Start: 2020-04-25 | End: 2020-04-25

## 2020-04-25 RX ORDER — APIXABAN 2.5 MG/1
2 TABLET, FILM COATED ORAL
Qty: 20 | Refills: 0
Start: 2020-04-25 | End: 2020-04-29

## 2020-04-25 RX ORDER — POTASSIUM CHLORIDE 20 MEQ
10 PACKET (EA) ORAL ONCE
Refills: 0 | Status: DISCONTINUED | OUTPATIENT
Start: 2020-04-25 | End: 2020-04-25

## 2020-04-25 RX ORDER — APIXABAN 2.5 MG/1
10 TABLET, FILM COATED ORAL EVERY 12 HOURS
Refills: 0 | Status: DISCONTINUED | OUTPATIENT
Start: 2020-04-25 | End: 2020-04-25

## 2020-04-25 RX ORDER — ASPIRIN/CALCIUM CARB/MAGNESIUM 324 MG
1 TABLET ORAL
Qty: 30 | Refills: 0
Start: 2020-04-25

## 2020-04-25 RX ORDER — ASPIRIN/CALCIUM CARB/MAGNESIUM 324 MG
1 TABLET ORAL
Qty: 0 | Refills: 0 | DISCHARGE

## 2020-04-25 RX ADMIN — APIXABAN 10 MILLIGRAM(S): 2.5 TABLET, FILM COATED ORAL at 11:53

## 2020-04-25 RX ADMIN — Medication 20 MILLIEQUIVALENT(S): at 11:53

## 2020-04-25 RX ADMIN — OXYCODONE AND ACETAMINOPHEN 1 TABLET(S): 5; 325 TABLET ORAL at 14:13

## 2020-04-25 NOTE — DISCHARGE NOTE PROVIDER - HOSPITAL COURSE
75 y/o F from home, ambulates independently, Lives with friend with a significant PMHx of HTN, HLD, Nasal Polyps, Former smoker with 20 pack history and PSH of Tubal ligation presents to the ED for chest pain, and abdominal pain radiating to the chest and back x12 days. Also reports weight loss, with good appetite. Patient denies any family history of DVT or PE. Patient denies any fever, chills, vomiting, diarrhea, calf pain, Dizziness, Dysuria,  recent travel or sick contacts, or any other complaints.    Pt found to have pancreatitis, was started on IV fluids. Pt also found to have malignant neoplasm of pancreas, had a liver biopsy with IR 4/24. Pt also with pulmonary embolism, on therapeutic Lovenox, which was held for liver biopsy: pt was transitioned to NOAC 4/25/20 with a Rx for Eliquis for 3 months. Pt was stable for discharge with appropriate follow-up with Dr. Haynes, her oncologist. 73 y/o F from home, ambulates independently, lives with friend with a significant PMHx of HTN, HLD, Nasal Polyps, Former smoker with 20 pack history and PSH of Tubal ligation presents to the ED for chest pain, and abdominal pain radiating to the chest and back x12 days. Also reports weight loss, with good appetite. Patient denies any family history of DVT or PE. Patient denies any fever, chills, vomiting, diarrhea, calf pain, Dizziness, Dysuria,  recent travel or sick contacts, or any other complaints.    Pt found to have pancreatitis, was started on IV fluids. Pt also found to have malignant neoplasm of pancreas, had a liver biopsy with IR 4/24. Pt also with pulmonary embolism, on therapeutic Lovenox, which was held for liver biopsy: pt was transitioned to NOAC 4/25/20 with a Rx for Eliquis for 3 months. Pt was stable for discharge with appropriate follow-up with Dr. Haynes, her oncologist.

## 2020-04-25 NOTE — DISCHARGE NOTE PROVIDER - NSDCCPCAREPLAN_GEN_ALL_CORE_FT
PRINCIPAL DISCHARGE DIAGNOSIS  Diagnosis: Malignant neoplasm of pancreas, unspecified location of malignancy  Assessment and Plan of Treatment: Please follow-up with Dr. Haynes in the office next week to review your biopsy results. Return to ED for any worsenning abd pain, nausea, vomiting, fever, chills, shortness of breath.      SECONDARY DISCHARGE DIAGNOSES  Diagnosis: Multiple subsegmental pulmonary emboli without acute cor pulmonale  Assessment and Plan of Treatment: Please take your Eliquis as prescribed.   You will be given your first dose of Eliquis in the hospital before you leave 4/25/20. Please take your second dose before bedtime 4/25.  From 4/26-4/30 you will take 10mg in the morning and 10mg in the evening.  From 5/1 and on you will take 5mg in the morning and 5mg in the evening for at least 30 days.   Your prescriptions have been sent to the pharmacy  Follow-up with your PCP as needed. PRINCIPAL DISCHARGE DIAGNOSIS  Diagnosis: Malignant neoplasm of pancreas, unspecified location of malignancy  Assessment and Plan of Treatment: Please follow-up with Dr. Haynes in the office next week to review your biopsy results. Return to ED for any worsenning abd pain, nausea, vomiting, fever, chills, shortness of breath.      SECONDARY DISCHARGE DIAGNOSES  Diagnosis: Multiple subsegmental pulmonary emboli without acute cor pulmonale  Assessment and Plan of Treatment: Please take your Eliquis as prescribed.   You will be given your first dose of Eliquis in the hospital before you leave 4/25/20. Please take your second dose before bedtime 4/25.  From 4/26-4/30 you will take 10mg in the morning and 10mg in the evening.  From 5/1 and on you will take 5mg in the morning and 5mg in the evening for at least 30 days.   Your prescriptions have been sent to the pharmacy  Follow-up with your PCP as needed.    Diagnosis: Moderate protein-calorie malnutrition  Assessment and Plan of Treatment: nutrition ans supplement as tolerated    Diagnosis: Acute pancreatitis  Assessment and Plan of Treatment: conservative management    Diagnosis: Hypokalemia  Assessment and Plan of Treatment: corrected

## 2020-04-25 NOTE — DISCHARGE NOTE PROVIDER - CARE PROVIDER_API CALL
Carlos Enrique Haynes)  Internal Medicine; Medical Oncology  [address] [place] [address]  Phone: [phone]  Fax: [phone]  Follow Up Time:

## 2020-04-25 NOTE — DISCHARGE NOTE PROVIDER - NSDCMRMEDTOKEN_GEN_ALL_CORE_FT
amLODIPine 5 mg oral tablet: 1 tab(s) orally once a day  apixaban 5 mg oral tablet: 2 tab(s) orally every 12 hours   aspirin 81 mg oral tablet: 1 tab(s) orally once a day  enalapril 5 mg oral tablet: 1 tab(s) orally once a day  simvastatin 10 mg oral tablet: 1 tab(s) orally once a day (at bedtime)  Tylenol Extra Strength 500 mg oral tablet: 1 tab(s) orally every 6 hours

## 2020-04-25 NOTE — DISCHARGE NOTE NURSING/CASE MANAGEMENT/SOCIAL WORK - PATIENT PORTAL LINK FT
You can access the FollowMyHealth Patient Portal offered by [facility] by registering at the following website: [email or web address]. By joining [email or web address]’s FollowMyHealth portal, you will also be able to view your health information using other applications (apps) compatible with our system.

## 2021-02-13 DIAGNOSIS — Z01.818 ENCOUNTER FOR OTHER PREPROCEDURAL EXAMINATION: ICD-10-CM

## 2021-02-13 PROBLEM — I10 ESSENTIAL (PRIMARY) HYPERTENSION: Chronic | Status: ACTIVE | Noted: 2020-04-17

## 2021-02-13 PROBLEM — E78.5 HYPERLIPIDEMIA, UNSPECIFIED: Chronic | Status: ACTIVE | Noted: 2020-04-17

## 2021-02-16 ENCOUNTER — APPOINTMENT (OUTPATIENT)
Dept: DISASTER EMERGENCY | Facility: CLINIC | Age: 76
End: 2021-02-16

## 2021-02-17 LAB — SARS-COV-2 N GENE NPH QL NAA+PROBE: NOT DETECTED

## 2021-02-19 ENCOUNTER — APPOINTMENT (OUTPATIENT)
Dept: INTERVENTIONAL RADIOLOGY/VASCULAR | Facility: HOSPITAL | Age: 76
End: 2021-02-19
Payer: MEDICARE

## 2021-02-19 ENCOUNTER — OUTPATIENT (OUTPATIENT)
Dept: OUTPATIENT SERVICES | Facility: HOSPITAL | Age: 76
LOS: 1 days | End: 2021-02-19
Payer: MEDICARE

## 2021-02-19 VITALS
SYSTOLIC BLOOD PRESSURE: 130 MMHG | OXYGEN SATURATION: 100 % | TEMPERATURE: 98 F | DIASTOLIC BLOOD PRESSURE: 73 MMHG | RESPIRATION RATE: 16 BRPM | HEART RATE: 81 BPM

## 2021-02-19 VITALS
OXYGEN SATURATION: 100 % | DIASTOLIC BLOOD PRESSURE: 79 MMHG | RESPIRATION RATE: 18 BRPM | TEMPERATURE: 98 F | SYSTOLIC BLOOD PRESSURE: 132 MMHG | HEART RATE: 80 BPM

## 2021-02-19 DIAGNOSIS — C25.9 MALIGNANT NEOPLASM OF PANCREAS, UNSPECIFIED: ICD-10-CM

## 2021-02-19 PROCEDURE — 36558 INSERT TUNNELED CV CATH: CPT

## 2021-02-19 PROCEDURE — 76937 US GUIDE VASCULAR ACCESS: CPT | Mod: 26

## 2021-02-19 PROCEDURE — 76937 US GUIDE VASCULAR ACCESS: CPT

## 2021-02-19 PROCEDURE — C1769: CPT

## 2021-02-19 PROCEDURE — 77001 FLUOROGUIDE FOR VEIN DEVICE: CPT | Mod: 26

## 2021-02-19 PROCEDURE — C1788: CPT

## 2021-02-19 PROCEDURE — 77001 FLUOROGUIDE FOR VEIN DEVICE: CPT

## 2021-02-19 RX ORDER — APIXABAN 2.5 MG/1
0 TABLET, FILM COATED ORAL
Qty: 0 | Refills: 0 | DISCHARGE

## 2021-02-19 RX ORDER — AMLODIPINE BESYLATE 2.5 MG/1
1 TABLET ORAL
Qty: 0 | Refills: 0 | DISCHARGE

## 2021-02-19 RX ORDER — ASPIRIN/CALCIUM CARB/MAGNESIUM 324 MG
1 TABLET ORAL
Qty: 0 | Refills: 0 | DISCHARGE

## 2021-02-19 RX ORDER — SODIUM CHLORIDE 9 MG/ML
1000 INJECTION INTRAMUSCULAR; INTRAVENOUS; SUBCUTANEOUS
Refills: 0 | Status: DISCONTINUED | OUTPATIENT
Start: 2021-02-19 | End: 2021-02-19

## 2021-02-19 RX ORDER — SIMVASTATIN 20 MG/1
1 TABLET, FILM COATED ORAL
Qty: 0 | Refills: 0 | DISCHARGE

## 2021-02-19 RX ORDER — HYDROMORPHONE HYDROCHLORIDE 2 MG/ML
0.5 INJECTION INTRAMUSCULAR; INTRAVENOUS; SUBCUTANEOUS
Refills: 0 | Status: DISCONTINUED | OUTPATIENT
Start: 2021-02-19 | End: 2021-02-19

## 2021-02-19 RX ORDER — CAPECITABINE 500 MG/1
0 TABLET ORAL
Qty: 0 | Refills: 0 | DISCHARGE

## 2021-02-19 RX ORDER — OMEPRAZOLE 10 MG/1
1 CAPSULE, DELAYED RELEASE ORAL
Qty: 0 | Refills: 0 | DISCHARGE

## 2021-02-19 RX ORDER — TRAMADOL HYDROCHLORIDE 50 MG/1
0 TABLET ORAL
Qty: 0 | Refills: 0 | DISCHARGE

## 2024-11-15 NOTE — ASU DISCHARGE PLAN (ADULT/PEDIATRIC) - FOR NEXT 24 HOURS DO NOT:
Statement Selected [No Acute Distress] : no acute distress [Well Nourished] : well nourished [Well Developed] : well developed [Well-Appearing] : well-appearing [Normal Sclera/Conjunctiva] : normal sclera/conjunctiva [PERRL] : pupils equal round and reactive to light [EOMI] : extraocular movements intact [Normal Outer Ear/Nose] : the outer ears and nose were normal in appearance [Normal Oropharynx] : the oropharynx was normal [Normal TMs] : both tympanic membranes were normal [Normal Nasal Mucosa] : the nasal mucosa was normal [No JVD] : no jugular venous distention [No Lymphadenopathy] : no lymphadenopathy [Supple] : supple [Thyroid Normal, No Nodules] : the thyroid was normal and there were no nodules present [No Respiratory Distress] : no respiratory distress  [No Accessory Muscle Use] : no accessory muscle use [Clear to Auscultation] : lungs were clear to auscultation bilaterally [Normal Rate] : normal rate  [Regular Rhythm] : with a regular rhythm [Normal S1, S2] : normal S1 and S2 [No Murmur] : no murmur heard [No Carotid Bruits] : no carotid bruits [No Abdominal Bruit] : a ~M bruit was not heard ~T in the abdomen [No Varicosities] : no varicosities [Pedal Pulses Present] : the pedal pulses are present [No Edema] : there was no peripheral edema [No Palpable Aorta] : no palpable aorta [No Extremity Clubbing/Cyanosis] : no extremity clubbing/cyanosis [Soft] : abdomen soft [Non Tender] : non-tender [Non-distended] : non-distended [No Masses] : no abdominal mass palpated [No HSM] : no HSM [Normal Bowel Sounds] : normal bowel sounds [Normal Posterior Cervical Nodes] : no posterior cervical lymphadenopathy [Normal Anterior Cervical Nodes] : no anterior cervical lymphadenopathy [No CVA Tenderness] : no CVA  tenderness [No Spinal Tenderness] : no spinal tenderness [No Joint Swelling] : no joint swelling [Grossly Normal Strength/Tone] : grossly normal strength/tone [No Rash] : no rash [Coordination Grossly Intact] : coordination grossly intact [No Focal Deficits] : no focal deficits [Normal Gait] : normal gait [Deep Tendon Reflexes (DTR)] : deep tendon reflexes were 2+ and symmetric [Speech Grossly Normal] : speech grossly normal [Memory Grossly Normal] : memory grossly normal [Normal Affect] : the affect was normal [Alert and Oriented x3] : oriented to person, place, and time [Normal Mood] : the mood was normal [Normal Insight/Judgement] : insight and judgment were intact

## 2025-07-16 NOTE — ED ADULT TRIAGE NOTE - LOCATION:
Patient confirmed earlier appt time for CT liver bx now scheduled for Thursday, July 17th at 0800. Pt to arrive at 0700.    Right arm;